# Patient Record
Sex: MALE | Race: WHITE | URBAN - METROPOLITAN AREA
[De-identification: names, ages, dates, MRNs, and addresses within clinical notes are randomized per-mention and may not be internally consistent; named-entity substitution may affect disease eponyms.]

---

## 2017-06-06 ENCOUNTER — OFFICE VISIT - HEALTHEAST (OUTPATIENT)
Dept: FAMILY MEDICINE | Facility: CLINIC | Age: 47
End: 2017-06-06

## 2017-06-06 DIAGNOSIS — L97.911 TRAUMATIC LEG ULCER, RIGHT, LIMITED TO BREAKDOWN OF SKIN (H): ICD-10-CM

## 2017-06-06 DIAGNOSIS — M79.89 LEG SWELLING: ICD-10-CM

## 2017-06-08 ENCOUNTER — OFFICE VISIT - HEALTHEAST (OUTPATIENT)
Dept: FAMILY MEDICINE | Facility: CLINIC | Age: 47
End: 2017-06-08

## 2017-06-08 DIAGNOSIS — M79.89 LEG SWELLING: ICD-10-CM

## 2017-06-08 DIAGNOSIS — J33.8 OTHER POLYP OF SINUS: ICD-10-CM

## 2017-06-08 DIAGNOSIS — L97.911 TRAUMATIC LEG ULCER, RIGHT, LIMITED TO BREAKDOWN OF SKIN (H): ICD-10-CM

## 2017-06-08 DIAGNOSIS — E78.00 HYPERCHOLESTEREMIA: ICD-10-CM

## 2017-06-20 ENCOUNTER — COMMUNICATION - HEALTHEAST (OUTPATIENT)
Dept: FAMILY MEDICINE | Facility: CLINIC | Age: 47
End: 2017-06-20

## 2017-09-11 ENCOUNTER — COMMUNICATION - HEALTHEAST (OUTPATIENT)
Dept: FAMILY MEDICINE | Facility: CLINIC | Age: 47
End: 2017-09-11

## 2017-09-11 DIAGNOSIS — I10 HTN (HYPERTENSION): ICD-10-CM

## 2017-11-29 ENCOUNTER — OFFICE VISIT - HEALTHEAST (OUTPATIENT)
Dept: FAMILY MEDICINE | Facility: CLINIC | Age: 47
End: 2017-11-29

## 2017-11-29 DIAGNOSIS — G89.29 CHRONIC RIGHT SHOULDER PAIN: ICD-10-CM

## 2017-11-29 DIAGNOSIS — M25.511 CHRONIC RIGHT SHOULDER PAIN: ICD-10-CM

## 2017-12-03 ENCOUNTER — OFFICE VISIT - HEALTHEAST (OUTPATIENT)
Dept: FAMILY MEDICINE | Facility: CLINIC | Age: 47
End: 2017-12-03

## 2017-12-03 DIAGNOSIS — J02.9 SORE THROAT: ICD-10-CM

## 2017-12-19 ENCOUNTER — OFFICE VISIT - HEALTHEAST (OUTPATIENT)
Dept: FAMILY MEDICINE | Facility: CLINIC | Age: 47
End: 2017-12-19

## 2017-12-19 DIAGNOSIS — S99.912A LEFT ANKLE INJURY, INITIAL ENCOUNTER: ICD-10-CM

## 2018-01-12 ENCOUNTER — RECORDS - HEALTHEAST (OUTPATIENT)
Dept: ADMINISTRATIVE | Facility: OTHER | Age: 48
End: 2018-01-12

## 2018-03-08 ENCOUNTER — COMMUNICATION - HEALTHEAST (OUTPATIENT)
Dept: FAMILY MEDICINE | Facility: CLINIC | Age: 48
End: 2018-03-08

## 2018-03-08 DIAGNOSIS — I10 HTN (HYPERTENSION): ICD-10-CM

## 2018-03-09 ENCOUNTER — AMBULATORY - HEALTHEAST (OUTPATIENT)
Dept: FAMILY MEDICINE | Facility: CLINIC | Age: 48
End: 2018-03-09

## 2018-03-09 DIAGNOSIS — I10 HTN (HYPERTENSION): ICD-10-CM

## 2018-04-05 ENCOUNTER — COMMUNICATION - HEALTHEAST (OUTPATIENT)
Dept: FAMILY MEDICINE | Facility: CLINIC | Age: 48
End: 2018-04-05

## 2018-04-05 DIAGNOSIS — I10 HTN (HYPERTENSION): ICD-10-CM

## 2018-04-17 ENCOUNTER — RECORDS - HEALTHEAST (OUTPATIENT)
Dept: ADMINISTRATIVE | Facility: OTHER | Age: 48
End: 2018-04-17

## 2018-04-24 ENCOUNTER — OFFICE VISIT - HEALTHEAST (OUTPATIENT)
Dept: FAMILY MEDICINE | Facility: CLINIC | Age: 48
End: 2018-04-24

## 2018-04-24 DIAGNOSIS — E66.9 OBESITY: ICD-10-CM

## 2018-04-24 DIAGNOSIS — Z95.1 S/P CABG X 2: ICD-10-CM

## 2018-04-24 DIAGNOSIS — I21.9 ACUTE MYOCARDIAL INFARCTION (H): ICD-10-CM

## 2018-04-24 DIAGNOSIS — G47.30 SLEEP APNEA: ICD-10-CM

## 2018-04-24 DIAGNOSIS — I10 ESSENTIAL HYPERTENSION: ICD-10-CM

## 2018-04-24 DIAGNOSIS — Z95.5 STENTED CORONARY ARTERY: ICD-10-CM

## 2018-04-24 ASSESSMENT — MIFFLIN-ST. JEOR: SCORE: 2284.67

## 2018-05-11 ENCOUNTER — AMBULATORY - HEALTHEAST (OUTPATIENT)
Dept: CARDIOLOGY | Facility: CLINIC | Age: 48
End: 2018-05-11

## 2018-05-17 ENCOUNTER — OFFICE VISIT - HEALTHEAST (OUTPATIENT)
Dept: CARDIOLOGY | Facility: CLINIC | Age: 48
End: 2018-05-17

## 2018-05-17 DIAGNOSIS — E78.2 MIXED HYPERLIPIDEMIA: ICD-10-CM

## 2018-05-17 DIAGNOSIS — I25.810 CORONARY ARTERY DISEASE INVOLVING CORONARY BYPASS GRAFT OF NATIVE HEART WITHOUT ANGINA PECTORIS: ICD-10-CM

## 2018-05-17 DIAGNOSIS — I10 ESSENTIAL HYPERTENSION: ICD-10-CM

## 2018-05-17 ASSESSMENT — MIFFLIN-ST. JEOR: SCORE: 2271.07

## 2018-06-14 ENCOUNTER — COMMUNICATION - HEALTHEAST (OUTPATIENT)
Dept: SLEEP MEDICINE | Facility: CLINIC | Age: 48
End: 2018-06-14

## 2018-06-21 ENCOUNTER — OFFICE VISIT - HEALTHEAST (OUTPATIENT)
Dept: SLEEP MEDICINE | Facility: CLINIC | Age: 48
End: 2018-06-21

## 2018-06-21 ENCOUNTER — AMBULATORY - HEALTHEAST (OUTPATIENT)
Dept: SLEEP MEDICINE | Facility: CLINIC | Age: 48
End: 2018-06-21

## 2018-06-21 DIAGNOSIS — J33.9 NASAL POLYP: ICD-10-CM

## 2018-06-21 DIAGNOSIS — G47.33 OSA ON CPAP: ICD-10-CM

## 2018-06-21 ASSESSMENT — MIFFLIN-ST. JEOR: SCORE: 2311.89

## 2018-06-26 ENCOUNTER — OFFICE VISIT - HEALTHEAST (OUTPATIENT)
Dept: FAMILY MEDICINE | Facility: CLINIC | Age: 48
End: 2018-06-26

## 2018-06-26 DIAGNOSIS — L72.3 SEBACEOUS CYST: ICD-10-CM

## 2018-06-26 DIAGNOSIS — Z95.1 S/P CABG X 2: ICD-10-CM

## 2018-06-26 DIAGNOSIS — J33.9 NASAL POLYP: ICD-10-CM

## 2018-06-26 DIAGNOSIS — G47.33 OBSTRUCTIVE SLEEP APNEA: ICD-10-CM

## 2018-06-26 DIAGNOSIS — I10 HTN (HYPERTENSION): ICD-10-CM

## 2018-06-26 DIAGNOSIS — I25.10 CAD (CORONARY ARTERY DISEASE): ICD-10-CM

## 2018-06-26 ASSESSMENT — MIFFLIN-ST. JEOR: SCORE: 2298.28

## 2018-07-05 ENCOUNTER — AMBULATORY - HEALTHEAST (OUTPATIENT)
Dept: SLEEP MEDICINE | Facility: CLINIC | Age: 48
End: 2018-07-05

## 2018-07-09 ENCOUNTER — OFFICE VISIT - HEALTHEAST (OUTPATIENT)
Dept: OTOLARYNGOLOGY | Facility: CLINIC | Age: 48
End: 2018-07-09

## 2018-07-09 DIAGNOSIS — J33.9 NASAL POLYPOSIS: ICD-10-CM

## 2018-07-17 ENCOUNTER — OFFICE VISIT - HEALTHEAST (OUTPATIENT)
Dept: FAMILY MEDICINE | Facility: CLINIC | Age: 48
End: 2018-07-17

## 2018-07-17 ENCOUNTER — ANESTHESIA - HEALTHEAST (OUTPATIENT)
Dept: SURGERY | Facility: CLINIC | Age: 48
End: 2018-07-17

## 2018-07-17 DIAGNOSIS — Z95.1 S/P CABG X 2: ICD-10-CM

## 2018-07-17 DIAGNOSIS — Z95.5 STENTED CORONARY ARTERY: ICD-10-CM

## 2018-07-17 DIAGNOSIS — G47.33 OBSTRUCTIVE SLEEP APNEA: ICD-10-CM

## 2018-07-17 DIAGNOSIS — J33.9 NASAL POLYP: ICD-10-CM

## 2018-07-17 DIAGNOSIS — Z01.818 PREOPERATIVE EXAMINATION: ICD-10-CM

## 2018-07-17 LAB
ANION GAP SERPL CALCULATED.3IONS-SCNC: 11 MMOL/L (ref 5–18)
BASOPHILS # BLD AUTO: 0.1 THOU/UL (ref 0–0.2)
BASOPHILS NFR BLD AUTO: 1 % (ref 0–2)
BUN SERPL-MCNC: 15 MG/DL (ref 8–22)
CALCIUM SERPL-MCNC: 9.6 MG/DL (ref 8.5–10.5)
CHLORIDE BLD-SCNC: 108 MMOL/L (ref 98–107)
CO2 SERPL-SCNC: 23 MMOL/L (ref 22–31)
CREAT SERPL-MCNC: 0.82 MG/DL (ref 0.7–1.3)
EOSINOPHIL # BLD AUTO: 0.2 THOU/UL (ref 0–0.4)
EOSINOPHIL NFR BLD AUTO: 1 % (ref 0–6)
ERYTHROCYTE [DISTWIDTH] IN BLOOD BY AUTOMATED COUNT: 11.5 % (ref 11–14.5)
GFR SERPL CREATININE-BSD FRML MDRD: >60 ML/MIN/1.73M2
GLUCOSE BLD-MCNC: 128 MG/DL (ref 70–125)
HCT VFR BLD AUTO: 43.5 % (ref 40–54)
HGB BLD-MCNC: 14.7 G/DL (ref 14–18)
INR PPP: 0.95 (ref 0.9–1.1)
LYMPHOCYTES # BLD AUTO: 3 THOU/UL (ref 0.8–4.4)
LYMPHOCYTES NFR BLD AUTO: 20 % (ref 20–40)
MCH RBC QN AUTO: 31.5 PG (ref 27–34)
MCHC RBC AUTO-ENTMCNC: 33.7 G/DL (ref 32–36)
MCV RBC AUTO: 93 FL (ref 80–100)
MONOCYTES # BLD AUTO: 0.8 THOU/UL (ref 0–0.9)
MONOCYTES NFR BLD AUTO: 5 % (ref 2–10)
NEUTROPHILS # BLD AUTO: 10.9 THOU/UL (ref 2–7.7)
NEUTROPHILS NFR BLD AUTO: 73 % (ref 50–70)
PLATELET # BLD AUTO: 334 THOU/UL (ref 140–440)
PMV BLD AUTO: 7.6 FL (ref 7–10)
POTASSIUM BLD-SCNC: 3.9 MMOL/L (ref 3.5–5)
RBC # BLD AUTO: 4.66 MILL/UL (ref 4.4–6.2)
SODIUM SERPL-SCNC: 142 MMOL/L (ref 136–145)
WBC: 14.9 THOU/UL (ref 4–11)

## 2018-07-17 ASSESSMENT — MIFFLIN-ST. JEOR: SCORE: 2271.07

## 2018-07-18 ENCOUNTER — COMMUNICATION - HEALTHEAST (OUTPATIENT)
Dept: FAMILY MEDICINE | Facility: CLINIC | Age: 48
End: 2018-07-18

## 2018-07-18 ENCOUNTER — SURGERY - HEALTHEAST (OUTPATIENT)
Dept: SURGERY | Facility: CLINIC | Age: 48
End: 2018-07-18

## 2018-07-18 LAB
ATRIAL RATE - MUSE: 99 BPM
DIASTOLIC BLOOD PRESSURE - MUSE: NORMAL MMHG
INTERPRETATION ECG - MUSE: NORMAL
P AXIS - MUSE: 51 DEGREES
PR INTERVAL - MUSE: 144 MS
QRS DURATION - MUSE: 112 MS
QT - MUSE: 362 MS
QTC - MUSE: 464 MS
R AXIS - MUSE: 14 DEGREES
SYSTOLIC BLOOD PRESSURE - MUSE: NORMAL MMHG
T AXIS - MUSE: 51 DEGREES
VENTRICULAR RATE- MUSE: 99 BPM

## 2018-07-18 ASSESSMENT — MIFFLIN-ST. JEOR: SCORE: 2272.43

## 2018-07-19 ENCOUNTER — COMMUNICATION - HEALTHEAST (OUTPATIENT)
Dept: FAMILY MEDICINE | Facility: CLINIC | Age: 48
End: 2018-07-19

## 2018-07-19 ASSESSMENT — MIFFLIN-ST. JEOR: SCORE: 2295.11

## 2018-07-23 ENCOUNTER — OFFICE VISIT - HEALTHEAST (OUTPATIENT)
Dept: FAMILY MEDICINE | Facility: CLINIC | Age: 48
End: 2018-07-23

## 2018-07-23 DIAGNOSIS — G47.33 OBSTRUCTIVE SLEEP APNEA: ICD-10-CM

## 2018-07-23 DIAGNOSIS — J33.9 NASAL POLYPOSIS: ICD-10-CM

## 2018-07-23 ASSESSMENT — MIFFLIN-ST. JEOR: SCORE: 2302.82

## 2018-07-25 ENCOUNTER — COMMUNICATION - HEALTHEAST (OUTPATIENT)
Dept: FAMILY MEDICINE | Facility: CLINIC | Age: 48
End: 2018-07-25

## 2018-07-31 ENCOUNTER — AMBULATORY - HEALTHEAST (OUTPATIENT)
Dept: SLEEP MEDICINE | Facility: CLINIC | Age: 48
End: 2018-07-31

## 2018-08-01 ENCOUNTER — OFFICE VISIT - HEALTHEAST (OUTPATIENT)
Dept: OTOLARYNGOLOGY | Facility: CLINIC | Age: 48
End: 2018-08-01

## 2018-08-01 DIAGNOSIS — Z98.890 S/P FUNCTIONAL ENDOSCOPIC SINUS SURGERY: ICD-10-CM

## 2018-08-24 ENCOUNTER — COMMUNICATION - HEALTHEAST (OUTPATIENT)
Dept: FAMILY MEDICINE | Facility: CLINIC | Age: 48
End: 2018-08-24

## 2018-09-13 ENCOUNTER — OFFICE VISIT - HEALTHEAST (OUTPATIENT)
Dept: FAMILY MEDICINE | Facility: CLINIC | Age: 48
End: 2018-09-13

## 2018-09-13 DIAGNOSIS — R00.2 PALPITATIONS: ICD-10-CM

## 2018-09-13 DIAGNOSIS — I25.2 HISTORY OF MI (MYOCARDIAL INFARCTION): ICD-10-CM

## 2018-09-13 DIAGNOSIS — I25.10 CAD (CORONARY ARTERY DISEASE): ICD-10-CM

## 2018-09-13 ASSESSMENT — MIFFLIN-ST. JEOR: SCORE: 2314.15

## 2018-09-14 LAB
ATRIAL RATE - MUSE: 92 BPM
DIASTOLIC BLOOD PRESSURE - MUSE: NORMAL MMHG
INTERPRETATION ECG - MUSE: NORMAL
P AXIS - MUSE: 41 DEGREES
PR INTERVAL - MUSE: 138 MS
QRS DURATION - MUSE: 116 MS
QT - MUSE: 390 MS
QTC - MUSE: 482 MS
R AXIS - MUSE: 10 DEGREES
SYSTOLIC BLOOD PRESSURE - MUSE: NORMAL MMHG
T AXIS - MUSE: 50 DEGREES
VENTRICULAR RATE- MUSE: 92 BPM

## 2018-09-18 ENCOUNTER — OFFICE VISIT - HEALTHEAST (OUTPATIENT)
Dept: FAMILY MEDICINE | Facility: CLINIC | Age: 48
End: 2018-09-18

## 2018-09-18 DIAGNOSIS — I10 ESSENTIAL HYPERTENSION: ICD-10-CM

## 2018-09-18 DIAGNOSIS — G47.33 OBSTRUCTIVE SLEEP APNEA: ICD-10-CM

## 2018-09-18 DIAGNOSIS — E66.01 MORBID OBESITY (H): ICD-10-CM

## 2018-09-18 DIAGNOSIS — Z95.1 S/P CABG X 2: ICD-10-CM

## 2018-09-18 DIAGNOSIS — R00.2 PALPITATIONS: ICD-10-CM

## 2018-09-18 LAB
ALBUMIN SERPL-MCNC: 3.9 G/DL (ref 3.5–5)
ALP SERPL-CCNC: 63 U/L (ref 45–120)
ALT SERPL W P-5'-P-CCNC: 37 U/L (ref 0–45)
ANION GAP SERPL CALCULATED.3IONS-SCNC: 8 MMOL/L (ref 5–18)
AST SERPL W P-5'-P-CCNC: 24 U/L (ref 0–40)
BASOPHILS # BLD AUTO: 0.1 THOU/UL (ref 0–0.2)
BASOPHILS NFR BLD AUTO: 1 % (ref 0–2)
BILIRUB SERPL-MCNC: 0.6 MG/DL (ref 0–1)
BUN SERPL-MCNC: 13 MG/DL (ref 8–22)
CALCIUM SERPL-MCNC: 9.6 MG/DL (ref 8.5–10.5)
CHLORIDE BLD-SCNC: 106 MMOL/L (ref 98–107)
CHOLEST SERPL-MCNC: 222 MG/DL
CO2 SERPL-SCNC: 25 MMOL/L (ref 22–31)
CREAT SERPL-MCNC: 0.73 MG/DL (ref 0.7–1.3)
EOSINOPHIL # BLD AUTO: 0.8 THOU/UL (ref 0–0.4)
EOSINOPHIL NFR BLD AUTO: 9 % (ref 0–6)
ERYTHROCYTE [DISTWIDTH] IN BLOOD BY AUTOMATED COUNT: 11.2 % (ref 11–14.5)
FASTING STATUS PATIENT QL REPORTED: NO
GFR SERPL CREATININE-BSD FRML MDRD: >60 ML/MIN/1.73M2
GLUCOSE BLD-MCNC: 108 MG/DL (ref 70–125)
HCT VFR BLD AUTO: 42.7 % (ref 40–54)
HDLC SERPL-MCNC: 41 MG/DL
HGB BLD-MCNC: 14.7 G/DL (ref 14–18)
LDLC SERPL CALC-MCNC: 143 MG/DL
LYMPHOCYTES # BLD AUTO: 2.8 THOU/UL (ref 0.8–4.4)
LYMPHOCYTES NFR BLD AUTO: 31 % (ref 20–40)
MCH RBC QN AUTO: 32 PG (ref 27–34)
MCHC RBC AUTO-ENTMCNC: 34.5 G/DL (ref 32–36)
MCV RBC AUTO: 93 FL (ref 80–100)
MONOCYTES # BLD AUTO: 0.6 THOU/UL (ref 0–0.9)
MONOCYTES NFR BLD AUTO: 6 % (ref 2–10)
NEUTROPHILS # BLD AUTO: 4.9 THOU/UL (ref 2–7.7)
NEUTROPHILS NFR BLD AUTO: 54 % (ref 50–70)
PLATELET # BLD AUTO: 277 THOU/UL (ref 140–440)
PMV BLD AUTO: 7.4 FL (ref 7–10)
POTASSIUM BLD-SCNC: 4.3 MMOL/L (ref 3.5–5)
PROT SERPL-MCNC: 7 G/DL (ref 6–8)
RBC # BLD AUTO: 4.61 MILL/UL (ref 4.4–6.2)
SODIUM SERPL-SCNC: 139 MMOL/L (ref 136–145)
TRIGL SERPL-MCNC: 188 MG/DL
TSH SERPL DL<=0.005 MIU/L-ACNC: 2.17 UIU/ML (ref 0.3–5)
WBC: 9.2 THOU/UL (ref 4–11)

## 2018-09-18 ASSESSMENT — MIFFLIN-ST. JEOR: SCORE: 2314.15

## 2018-09-19 ENCOUNTER — COMMUNICATION - HEALTHEAST (OUTPATIENT)
Dept: ADMINISTRATIVE | Facility: CLINIC | Age: 48
End: 2018-09-19

## 2018-09-19 ENCOUNTER — COMMUNICATION - HEALTHEAST (OUTPATIENT)
Dept: FAMILY MEDICINE | Facility: CLINIC | Age: 48
End: 2018-09-19

## 2018-09-21 ENCOUNTER — OFFICE VISIT - HEALTHEAST (OUTPATIENT)
Dept: SLEEP MEDICINE | Facility: CLINIC | Age: 48
End: 2018-09-21

## 2018-09-21 DIAGNOSIS — G47.33 OSA ON CPAP: ICD-10-CM

## 2018-09-21 ASSESSMENT — MIFFLIN-ST. JEOR: SCORE: 2305.08

## 2018-09-29 ENCOUNTER — OFFICE VISIT - HEALTHEAST (OUTPATIENT)
Dept: FAMILY MEDICINE | Facility: CLINIC | Age: 48
End: 2018-09-29

## 2018-09-29 DIAGNOSIS — M77.11 LATERAL EPICONDYLITIS OF RIGHT ELBOW: ICD-10-CM

## 2018-10-24 ENCOUNTER — COMMUNICATION - HEALTHEAST (OUTPATIENT)
Dept: ADMINISTRATIVE | Facility: CLINIC | Age: 48
End: 2018-10-24

## 2019-02-11 ENCOUNTER — OFFICE VISIT - HEALTHEAST (OUTPATIENT)
Dept: FAMILY MEDICINE | Facility: CLINIC | Age: 49
End: 2019-02-11

## 2019-02-11 DIAGNOSIS — M25.551 CHRONIC RIGHT HIP PAIN: ICD-10-CM

## 2019-02-11 DIAGNOSIS — M54.50 ACUTE RIGHT-SIDED LOW BACK PAIN WITHOUT SCIATICA: ICD-10-CM

## 2019-02-11 DIAGNOSIS — G89.29 CHRONIC RIGHT HIP PAIN: ICD-10-CM

## 2019-02-15 ENCOUNTER — OFFICE VISIT - HEALTHEAST (OUTPATIENT)
Dept: FAMILY MEDICINE | Facility: CLINIC | Age: 49
End: 2019-02-15

## 2019-02-15 ENCOUNTER — COMMUNICATION - HEALTHEAST (OUTPATIENT)
Dept: FAMILY MEDICINE | Facility: CLINIC | Age: 49
End: 2019-02-15

## 2019-02-15 DIAGNOSIS — G47.33 OBSTRUCTIVE SLEEP APNEA: ICD-10-CM

## 2019-02-15 DIAGNOSIS — M25.551 HIP PAIN, RIGHT: ICD-10-CM

## 2019-02-15 DIAGNOSIS — M54.41 ACUTE RIGHT-SIDED LOW BACK PAIN WITH RIGHT-SIDED SCIATICA: ICD-10-CM

## 2019-02-28 ENCOUNTER — COMMUNICATION - HEALTHEAST (OUTPATIENT)
Dept: NURSING | Facility: CLINIC | Age: 49
End: 2019-02-28

## 2019-03-14 ENCOUNTER — COMMUNICATION - HEALTHEAST (OUTPATIENT)
Dept: NURSING | Facility: CLINIC | Age: 49
End: 2019-03-14

## 2019-05-15 ENCOUNTER — COMMUNICATION - HEALTHEAST (OUTPATIENT)
Dept: FAMILY MEDICINE | Facility: CLINIC | Age: 49
End: 2019-05-15

## 2019-07-03 ENCOUNTER — COMMUNICATION - HEALTHEAST (OUTPATIENT)
Dept: FAMILY MEDICINE | Facility: CLINIC | Age: 49
End: 2019-07-03

## 2019-07-03 DIAGNOSIS — I10 HTN (HYPERTENSION): ICD-10-CM

## 2019-11-08 ENCOUNTER — HEALTH MAINTENANCE LETTER (OUTPATIENT)
Age: 49
End: 2019-11-08

## 2020-05-06 ENCOUNTER — COMMUNICATION - HEALTHEAST (OUTPATIENT)
Dept: FAMILY MEDICINE | Facility: CLINIC | Age: 50
End: 2020-05-06

## 2020-05-06 DIAGNOSIS — I10 HTN (HYPERTENSION): ICD-10-CM

## 2020-05-11 ENCOUNTER — COMMUNICATION - HEALTHEAST (OUTPATIENT)
Dept: FAMILY MEDICINE | Facility: CLINIC | Age: 50
End: 2020-05-11

## 2020-12-06 ENCOUNTER — HEALTH MAINTENANCE LETTER (OUTPATIENT)
Age: 50
End: 2020-12-06

## 2021-05-30 NOTE — TELEPHONE ENCOUNTER
Refill Approved    Rx renewed per Medication Renewal Policy. Medication was last renewed on 6/26/18.  Last OV:  2/15/19    Radha Carrillo, [facility] Connection Triage/Med Refill 7/4/2019     Requested Prescriptions   Pending Prescriptions Disp Refills     lisinopril-hydrochlorothiazide (PRINZIDE,ZESTORETIC) 20-25 mg per tablet [Pharmacy Med Name: LISINOPRIL-HYDROCHLOROTHIAZIDE 20 MG-25MG TABLET] 90 tablet 2     Sig: Take 1 tablet by mouth every morning.       Diuretics/Combination Diuretics Refill Protocol  Passed - 7/3/2019 12:00 PM        Passed - Visit with PCP or prescribing provider visit in past 12 months     Last office visit with prescriber/PCP: 2/15/2019 Carina Carter MD OR same dept: 2/15/2019 Carina Carter MD OR same specialty: 2/15/2019 Carina Carter MD  Last physical: 7/17/2018 Last MTM visit: Visit date not found   Next visit within 3 mo: Visit date not found  Next physical within 3 mo: Visit date not found  Prescriber OR PCP: Carina Carter MD  Last diagnosis associated with med order: 1. HTN (hypertension)  - lisinopril-hydrochlorothiazide (PRINZIDE,ZESTORETIC) 20-25 mg per tablet [Pharmacy Med Name: LISINOPRIL-HYDROCHLOROTHIAZIDE 20 MG-25MG TABLET]; Take 1 tablet by mouth every morning.  Dispense: 90 tablet; Refill: 3    If protocol passes may refill for 12 months if within 3 months of last provider visit (or a total of 15 months).             Passed - Serum Potassium in past 12 months      Lab Results   Component Value Date    Potassium 4.3 09/18/2018             Passed - Serum Sodium in past 12 months      Lab Results   Component Value Date    Sodium 139 09/18/2018             Passed - Blood pressure on file in past 12 months     BP Readings from Last 1 Encounters:   02/15/19 122/82             Passed - Serum Creatinine in past 12 months      Creatinine   Date Value Ref Range Status   09/18/2018 0.73 0.70 - 1.30 mg/dL Final

## 2021-05-31 VITALS — WEIGHT: 300 LBS | BODY MASS INDEX: 45.61 KG/M2

## 2021-05-31 VITALS — BODY MASS INDEX: 46.68 KG/M2 | WEIGHT: 307 LBS

## 2021-05-31 VITALS — BODY MASS INDEX: 47.81 KG/M2 | WEIGHT: 314.44 LBS

## 2021-05-31 VITALS — BODY MASS INDEX: 46.38 KG/M2 | WEIGHT: 305 LBS

## 2021-05-31 VITALS — BODY MASS INDEX: 47.35 KG/M2 | WEIGHT: 311.4 LBS

## 2021-06-01 ENCOUNTER — RECORDS - HEALTHEAST (OUTPATIENT)
Dept: ADMINISTRATIVE | Facility: CLINIC | Age: 51
End: 2021-06-01

## 2021-06-01 VITALS — WEIGHT: 315 LBS | BODY MASS INDEX: 46.65 KG/M2 | HEIGHT: 69 IN

## 2021-06-01 VITALS — BODY MASS INDEX: 46.65 KG/M2 | WEIGHT: 315 LBS | HEIGHT: 69 IN

## 2021-06-01 VITALS — HEIGHT: 69 IN | BODY MASS INDEX: 46.65 KG/M2 | WEIGHT: 315 LBS

## 2021-06-01 VITALS — HEIGHT: 69 IN | WEIGHT: 315 LBS | BODY MASS INDEX: 46.65 KG/M2

## 2021-06-01 VITALS — BODY MASS INDEX: 46.06 KG/M2 | HEIGHT: 69 IN | WEIGHT: 311 LBS

## 2021-06-01 VITALS — WEIGHT: 311 LBS | BODY MASS INDEX: 46.06 KG/M2 | HEIGHT: 69 IN

## 2021-06-01 VITALS — WEIGHT: 314 LBS | HEIGHT: 69 IN | BODY MASS INDEX: 46.51 KG/M2

## 2021-06-02 ENCOUNTER — RECORDS - HEALTHEAST (OUTPATIENT)
Dept: ADMINISTRATIVE | Facility: CLINIC | Age: 51
End: 2021-06-02

## 2021-06-02 VITALS — BODY MASS INDEX: 46.35 KG/M2 | WEIGHT: 315 LBS

## 2021-06-02 VITALS — BODY MASS INDEX: 45.1 KG/M2 | WEIGHT: 315 LBS | HEIGHT: 70 IN

## 2021-06-02 VITALS — HEIGHT: 70 IN | WEIGHT: 315 LBS | BODY MASS INDEX: 45.1 KG/M2

## 2021-06-02 VITALS — BODY MASS INDEX: 46.29 KG/M2 | WEIGHT: 315 LBS

## 2021-06-02 VITALS — WEIGHT: 315 LBS | HEIGHT: 70 IN | BODY MASS INDEX: 45.1 KG/M2

## 2021-06-02 VITALS — WEIGHT: 312 LBS | BODY MASS INDEX: 44.77 KG/M2

## 2021-06-08 NOTE — TELEPHONE ENCOUNTER
Left message for pt to call back. Pt needs appointment prior to refills of medication. Please assist in scheduling.

## 2021-06-08 NOTE — TELEPHONE ENCOUNTER
Left message for pt to call back. Pt needs appointment prior to refills. MyChart message being sent as well. Please assist in scheduling

## 2021-06-08 NOTE — TELEPHONE ENCOUNTER
RN cannot approve Refill Request    RN can NOT refill this medication Protocol failed and NO refill given.      Georgie Dee, Care Connection Triage/Med Refill 5/7/2020    Requested Prescriptions   Pending Prescriptions Disp Refills     lisinopriL-hydrochlorothiazide (PRINZIDE,ZESTORETIC) 20-25 mg per tablet [Pharmacy Med Name: lisinopril 20 mg-hydrochlorothiazide 25 mg tablet] 90 tablet 3     Sig: TAKE 1 TABLET BY MOUTH EVERY MORNING       Diuretics/Combination Diuretics Refill Protocol  Failed - 5/6/2020  3:57 PM        Failed - Visit with PCP or prescribing provider visit in past 12 months     Last office visit with prescriber/PCP: 2/15/2019 Carina Carter MD OR same dept: Visit date not found OR same specialty: 2/15/2019 Carina Carter MD  Last physical: 7/17/2018 Last MTM visit: Visit date not found   Next visit within 3 mo: Visit date not found  Next physical within 3 mo: Visit date not found  Prescriber OR PCP: Carina Carter MD  Last diagnosis associated with med order: 1. HTN (hypertension)  - lisinopriL-hydrochlorothiazide (PRINZIDE,ZESTORETIC) 20-25 mg per tablet [Pharmacy Med Name: lisinopril 20 mg-hydrochlorothiazide 25 mg tablet]; Take 1 tablet by mouth every morning.  Dispense: 90 tablet; Refill: 2    If protocol passes may refill for 12 months if within 3 months of last provider visit (or a total of 15 months).             Failed - Serum Potassium in past 12 months      No results found for: LN-POTASSIUM          Failed - Serum Sodium in past 12 months      No results found for: LN-SODIUM          Failed - Blood pressure on file in past 12 months     BP Readings from Last 1 Encounters:   02/15/19 122/82             Failed - Serum Creatinine in past 12 months      Creatinine   Date Value Ref Range Status   09/18/2018 0.73 0.70 - 1.30 mg/dL Final

## 2021-06-11 NOTE — PROGRESS NOTES
Assessment & Plan:  1. Hypercholesteremia  Patient requesting refill on atorvastatin.  Discussed that he is due for lab work but not fasting today.  Entered a future order for lab work and will provide 90 day refill until he is able to come in for labs.  Also recommended that he come in for annual physical in the next 6 months.  - atorvastatin (LIPITOR) 80 MG tablet; Take 1 tablet (80 mg total) by mouth at bedtime.  Dispense: 90 tablet; Refill: 0  - Lipid Manati FASTING; Future    2. Traumatic leg ulcer, right, limited to breakdown of skin  Ulcer is healing as expected.  No signs of infection today.  Discussed at home cares and wound dressing.  Discussed signs and symptoms of infection and when to return to clinic.    3. Leg swelling  Some concern for healing given the amount of swelling and venous stasis that seems to be chronic for the patient.  Discussed with him that weight loss and dietary changes would help him to have less lower extremity swelling most likely.  Encouraged him to try to improve diet and encouraged him in losing weight.  Also important that he follow-up with cardiology for at least annual monitoring unless they tell him differently.  If he feels that the wound is not healing as expected he should let us know and we would consider referral to vascular for management of the wound at that time.    4. Other polyp of sinus  Chronic polyps of the sinus.  Patient was supposed to have surgery but never got it scheduled.  We will enter new referral to ENT for evaluation.  Patient requesting prednisone today for treatment of sinus pressure, however declined due to problems this could cause with wound healing.  - Ambulatory referral to ENT      There are no Patient Instructions on file for this visit.    Orders Placed This Encounter   Procedures     Lipid Manati FASTING     Standing Status:   Future     Standing Expiration Date:   6/8/2018     Order Specific Question:   Fasting is required?     Answer:    Yes     Ambulatory referral to ENT     Referral Priority:   Routine     Referral Type:   Consultation     Referral Reason:   Evaluation and Treatment     Requested Specialty:   Otolaryngology     Number of Visits Requested:   1     Medications Discontinued During This Encounter   Medication Reason     atorvastatin (LIPITOR) 80 MG tablet Reorder           The following high BMI interventions were performed this visit: encouragement to exercise, weight monitoring and dietary management education, guidance, and counseling    Chief Complaint:   Chief Complaint   Patient presents with     Follow-up     leg wound        History of Present Illness:  Sigifredo is a 46 y.o. male presenting to the clinic today for follow-up from walk-in care visit on 6/6/2017.  Patient presented at that time with greater than 1 year complaint of leg swelling which had been chronic for him.  He also has chronic blistering that can come and go on the right lower leg.  2 days ago he had an itch on his right lower leg and when he scratched it it opened up and created an open sore.  No active drainage or redness, but he is concerned and was recommended to follow-up for monitoring of signs of infection.  He has some questions today about why he gets chronic leg swelling and why it would contribute to him performing this type of wound.    Secondly he mentions that he is due to follow-up with his cardiologist.  He had an acute MI in 2014 and has not seen cardiology in a few years.  He is due for lab work today but is not fasting.  He is requesting refills on his lipid medication.  I told him that he needs to follow with cardiology and also to schedule a physical but we will provide him with a refill today in order future labs so he can return to get those accomplished.    Lastly he mentions some ongoing sinus polyps that he has had chronically.  He had seen an ENT a couple of years ago and was supposed to be scheduled for surgery but this never occurred.   He has chronic congestion and sinus pressure.  He has questions today about going on prednisone to treat some of this until he can have surgery.    Review of Systems:  All other systems are negative except as noted above.    [facility]:  Reviewed and updated.    Tobacco Use:  History   Smoking Status     Former Smoker   Smokeless Tobacco     Current User     Comment: Quit 10/11/14 and uses E-cigarette daily        Vitals:  Vitals:    06/08/17 0941   BP: 142/80   Patient Site: Right Arm   Patient Position: Sitting   Cuff Size: Adult Large   Pulse: 78   Resp: 18   Weight: (!) 314 lb 7 oz (142.6 kg)     Wt Readings from Last 3 Encounters:   06/08/17 (!) 314 lb 7 oz (142.6 kg)   06/06/17 (!) 311 lb 6.4 oz (141.3 kg)   09/14/16 (!) 300 lb (136.1 kg)       Physical Exam:  Constitutional:  Reveals an alert, cooperative, 46-year-old male in no acute distress.  Vitals:  Per nursing notes.  Cardiovascular:  Regular rate and rhythm without murmurs, rubs, or gallops. Carotids without bruits. Legs swollen but without pitting edema.   Respiratory: Clear.  Respiratory effort normal.  Skin:   Ulceration on the lower anterior leg approximately 4 cm in diameter.  Appears to have normal healing with granulation tissue.  No signs of infection, no redness warmth or swelling around the wound.  Musculoskeletal: CMS intact, distal pulses intact but slightly difficult to palpate.  Psychiatric:  Mood appropriate, memory intact.     Data Reviewed:  Additional History from Old Records or Another Person Summarized (2 total): None.     Decision to Obtain Extra information (1 total): None.     Radiology Tests Summarized and Ordered (XRAY/CT/MRI/DXA) (1 total): None.    Labs Reviewed and Ordered (1 total): Lipid Fannin    Medicine Tests Summarized and Ordered (EKG/ECHO/COLONOSCOPY/EGD) (1 total): None.    Independent Review of EKG or X-Ray (2 each): None.    The visit lasted a total of 25 minutes face to face with the patient. Over 50% of the time was  spent counseling and educating the patient about plan of care.    Medications:  Current Outpatient Prescriptions   Medication Sig Dispense Refill     aspirin 81 MG EC tablet Take 81 mg by mouth daily.       atorvastatin (LIPITOR) 80 MG tablet Take 1 tablet (80 mg total) by mouth at bedtime. 90 tablet 0     fluticasone (FLONASE) 50 mcg/actuation nasal spray 2 sprays in each nostril twice daily. 16 g 0     lisinopril-hydrochlorothiazide (PRINZIDE,ZESTORETIC) 20-25 mg per tablet TAKE 1 TABLET BY MOUTH EVERY MORNING. 90 tablet 2     nitroglycerin (NITROSTAT) 0.4 MG SL tablet Place 1 tablet (0.4 mg total) under the tongue every 5 (five) minutes as needed for chest pain. 20 tablet 0     No current facility-administered medications for this visit.        Total Data Points: 1  MANDEEP Calles, CNP    This note has been dictated using voice recognition software. Any grammatical or context distortions are unintentional and inherent to the software

## 2021-06-11 NOTE — PROGRESS NOTES
ASSESSMENT:   1. Traumatic leg ulcer, right, limited to breakdown of skin     2. Leg swelling          PLAN:  Leg ulceration secondary to chronic leg swelling/venous stasis. There is no surrounding erythema, induration, or warmth - no evidence of infection.  He has no calf tenderness, erythema, or warmth suggestive of  DVT. Discussed good wound care: keep wound clean and dry, dress with thin layer of topical antibiotic ointment, nonadherent gauze, and gauze wrap. Instructed to change dressing twice daily.      Elevate legs throughout the day.  Decrease intake of salt and processed foods.    Recheck with your primary care provider in 2-3 days.  If redness, warmth, purulent drainage, pain of the leg, or fever/chills - go to the Emergency Room immediately.      SUBJECTIVE:   Sigifredo Shepard is a 46 y.o. male presents today with >1 year complaint of bilateral leg swelling.   He states swelling is actually improved over the past several months.  No abrupt change in leg swelling.  He reports chronic blistering that can come and go on the right anterior lower leg. 2 days ago, he noticed an itch on his right lower leg, that opened up when he scratched it with his opposite heel.  Now there is an open sore.   He is not having active drainage or redness.  No warmth.  He reports minimal pain of the area.     He admits he has gained approx 10-15 lbs in the past year or two due to decreased physical activity.  He states he does not monitor his salt intake. He works nights and stands most of his shift. He had an AMI in 2014.  He has not seen cardiology in several years.        Denies fever, chills. Denies drainage or warmth. Denies chest pain or shortness of breath.    Patient Active Problem List   Diagnosis     Acute myocardial infarction, unspecified site, episode of care unspecified     Hypercholesteremia     Obesity, unspecified     HTN (hypertension)       History   Smoking Status     Former Smoker   Smokeless Tobacco     Current  User     Comment: Quit 10/11/14 and uses E-cigarette daily        Current Medications:  Current Outpatient Prescriptions on File Prior to Visit   Medication Sig Dispense Refill     aspirin 81 MG EC tablet Take 81 mg by mouth daily.       atorvastatin (LIPITOR) 80 MG tablet Take 1 tablet (80 mg total) by mouth bedtime. 90 tablet 3     fluticasone (FLONASE) 50 mcg/actuation nasal spray 2 sprays in each nostril twice daily. 16 g 0     lisinopril-hydrochlorothiazide (PRINZIDE,ZESTORETIC) 20-25 mg per tablet TAKE 1 TABLET BY MOUTH EVERY MORNING. 90 tablet 2     nitroglycerin (NITROSTAT) 0.4 MG SL tablet Place 1 tablet (0.4 mg total) under the tongue every 5 (five) minutes as needed for chest pain. 20 tablet 0     lisinopril-hydrochlorothiazide (PRINZIDE,ZESTORETIC) 20-25 mg per tablet Take 1 tablet by mouth every morning. 90 tablet 3     No current facility-administered medications on file prior to visit.        Allergies:   Allergies   Allergen Reactions     Diltiazem      Slows heart rate (bradycardia)        OBJECTIVE:   Vitals:    06/06/17 1303   BP: 146/90   Pulse: 85   Resp: 20   Temp: 97.9  F (36.6  C)   TempSrc: Oral   SpO2: 97%   Weight: (!) 311 lb 6.4 oz (141.3 kg)     Physical exam reveals a46 y.o. male.   Appears healthy, alert, cooperative and in NAD.  Lungs: Chest is clear, no wheezing, rhonchi or rales. Symmetric air entry throughout both lung fields.  Heart: regular rate and rhythm, no murmur, rub or gallop  Extremity: dorsal pedis and posterior tibialis pulses intact bilaterally. 2+ pitting edema bilaterally. 3cm x2cm open sore on his right lower leg,  above the ankle.  There is a flap of tissue bunched up inferiorly that I removed with an iris scissors.  The wound tissue appears healthy. There is no surrounding erythema, drainage, or red streaking.  No warmth of the leg.  No tenderness of the calf or lower leg. He has a tan line where his socks reach the ankles bilaterally, though there is no redness or  induration.

## 2021-06-14 NOTE — PROGRESS NOTES
Chief Complaint   Patient presents with     Shoulder Pain     ongoing - seen a few people for this - told  it was a pinched nerve        HPI:    Patient is here for about 2 hrs of right shoulder pain, constant with a mild pain, but becomes moderate with ROM, radiating down to right proximal upper arm. No recalled injury. No fever, chills, tingling/numbess of right arm. He took 400 mg Ibuprofen without relief.    ROS: Pertinent ROS noted in HPI.     Allergies   Allergen Reactions     Diltiazem      Slows heart rate (bradycardia)        Patient Active Problem List   Diagnosis     Acute myocardial infarction, unspecified site, episode of care unspecified     Hypercholesteremia     Obesity, unspecified     HTN (hypertension)       Family History   Problem Relation Age of Onset     Heart disease Maternal Grandfather      Diabetes Mother      Hyperlipidemia Mother      Hypertension Mother      Heart disease Father      four heart attacks. first at age 49     Diabetes Father      Hyperlipidemia Father        Social History     Social History     Marital status:      Spouse name: N/A     Number of children: N/A     Years of education: N/A     Occupational History     Not on file.     Social History Main Topics     Smoking status: Current Every Day Smoker     Smokeless tobacco: Current User      Comment: Quit 10/11/14 and uses E-cigarette daily      Alcohol use No     Drug use: No     Sexual activity: Not on file     Other Topics Concern     Not on file     Social History Narrative         Objective:    Vitals:    11/29/17 1603   BP: 118/76   Pulse: 90   Resp: 16   Temp: 97.9  F (36.6  C)   SpO2: 98%       Gen:NAD  CV:RRR, no M, R, G  Pulm: CTAB  MSK: normal inspection of shoulders, b/l UEs. Mild TTP at right AC joint and deltoid area. Full ROM and strength of shoulders and b/l UEs, but with lots of pain above raising right arm over shoulder. 5/5 strength of b/l UEs.      Chronic right shoulder pain  -     predniSONE  (DELTASONE) 20 MG tablet; Take 2 tablets (40 mg total) by mouth daily for 5 days.      Rule out impingement/rotator cuff pathology. F/u as directed.

## 2021-06-14 NOTE — PROGRESS NOTES
Chief Complaint   Patient presents with     Fall     to the left ankle. pt slipped on ice this AM, and felt a popped to the area. Admit pain         HPI:    Patient is here for left ankle injury occurred this AM after he slipped on ice and injured left ankle. He felt a pop on left ankle. Pain is severe with weight bearing. He has been using his crutches to get around. No pain at rest. No head injury or pain at other body sites. No fever, tingling/numbness of lower extremities.     ROS: Pertinent ROS noted in HPI.     Allergies   Allergen Reactions     Diltiazem      Slows heart rate (bradycardia)      Patient Active Problem List   Diagnosis     Acute myocardial infarction, unspecified site, episode of care unspecified     Hypercholesteremia     Obesity, unspecified     HTN (hypertension)       Family History   Problem Relation Age of Onset     Heart disease Maternal Grandfather      Diabetes Mother      Hyperlipidemia Mother      Hypertension Mother      Heart disease Father      four heart attacks. first at age 49     Diabetes Father      Hyperlipidemia Father        Social History     Social History     Marital status:      Spouse name: N/A     Number of children: N/A     Years of education: N/A     Occupational History     Not on file.     Social History Main Topics     Smoking status: Current Every Day Smoker     Smokeless tobacco: Current User      Comment: Quit 10/11/14 and uses E-cigarette daily      Alcohol use No     Drug use: No     Sexual activity: Not on file     Other Topics Concern     Not on file     Social History Narrative         Objective:    Vitals:    12/19/17 0803   BP: 124/80   Pulse: (!) 102   Resp: 14   Temp: 97.7  F (36.5  C)   SpO2: 96%       Gen:NAD  MSK: mild edema without erythema of left lateral ankle with moderate paint to palpation anterior to the left lateral malleolus. Reduced ROM of left ankle in all planes due to pain. Reduced resisted inversion and eversion of left ankle.  Normal strength of left ankle with extension and flexion. Normal palpation of left lower leg, foot and toes. Unable to assess gait as patient was hesitant to bear weight on left foot.  Neuro: intact and symmetric gross sensation of feet.       Xr Ankle Left 3 Or More Vws    Result Date: 12/19/2017  EXAM DATE:         12/19/2017 [facility] X-RAY ANKLE LEFT, MINIMUM THREE VIEWS 12/19/2017 8:30 AM INDICATION: INJURY TO LEFT ANKLE WITH PAIN AT LATERAL MALLEOLU COMPARISON: None. FINDINGS: Mild soft tissue swelling medially. Minimal soft tissue swelling laterally. 5 mm bone fragment projecting between the lateral malleolus and talus could be acute or chronic. Unfused os trigonum.     XR - ordered and reviewed by me, noting a bone fragment between talus and lateral malleolus, discussed during visit.        Left ankle injury, initial encounter  -     XR Ankle Left 3 or More VWS      Concern for acute vs chronic avulsion fracture per XR. Recommended f/u with [facility]. Non-weight bearing with crutches for ambulation (patient has crutches). Supportive cares as directed.

## 2021-06-14 NOTE — PROGRESS NOTES
NATE Shepard is a 47 y.o. male here sore throat.  His son was diagnosed with strep and he wants to make sure he does not have it.  He has had no fevers.  Mild cough and sneezing.  Past Medical History:   Diagnosis Date     CAD (coronary artery disease)      HTN (hypertension)      MI (myocardial infarction)      Toe contusion      Current Outpatient Prescriptions on File Prior to Visit   Medication Sig Dispense Refill     aspirin 81 MG EC tablet Take 81 mg by mouth daily.       atorvastatin (LIPITOR) 80 MG tablet Take 1 tablet (80 mg total) by mouth at bedtime. 90 tablet 0     fluticasone (FLONASE) 50 mcg/actuation nasal spray 2 sprays in each nostril twice daily. 16 g 0     lisinopril-hydrochlorothiazide (PRINZIDE,ZESTORETIC) 20-25 mg per tablet TAKE 1 TABLET BY MOUTH EVERY MORNING. 90 tablet 0     predniSONE (DELTASONE) 20 MG tablet Take 2 tablets (40 mg total) by mouth daily for 5 days. 10 tablet 0     No current facility-administered medications on file prior to visit.      ?  ROS:   General: No fevers, chills  Ears: No pain  Oropharynx: +sore throat  CV: No chest pain  Resp: No shortness of breath or cough.  ?  O  /80  Pulse (!) 103  Temp 98.1  F (36.7  C) (Oral)   Resp 18  Wt (!) 307 lb (139.3 kg)  SpO2 95%  BMI 46.68 kg/m2   Vitals reviewed. Nursing note reviewed.  General Appearance: Pleasant and alert, in no acute distress  HEENT: TMs clear, oropharynx without edema or exudate, mucous membranes moist, neck supple, no lymphadenopathy.  Mild mattering around eyes.  CV: RRR, no murmur, rubs, gallops  Resp: No respiratory distress. Clear to auscultation bilaterally. No wheezes, rales, rhonchi  Skin: warm, dry, intact, no rash noted  Neuro: no focal deficits, CNs II-XII normal.   A/P  Sigifredo was seen today for sore throat.    Diagnoses and all orders for this visit:    Sore throat- negative. Likely has viral illness causing throat pain.   -     Rapid Strep A Screen-Throat  -     Group A Strep, RNA  Direct Detection, Throat         Options for treatment and follow-up care were reviewed with the patient and/or guardian. Sigifredo Shepard and/or guardian engaged in the decision making process and verbalized understanding of the options discussed and agreed with the final plan.    Julia Toussaint MD

## 2021-06-17 NOTE — PATIENT INSTRUCTIONS - HE
Patient Instructions by Shahnaz Dorsey CNP at 2/11/2019  7:30 AM     Author: Shahnaz Dorsey CNP Service: -- Author Type: Nurse Practitioner    Filed: 2/11/2019  7:58 AM Encounter Date: 2/11/2019 Status: Addendum    : Shahnaz Dorsey CNP (Nurse Practitioner)    Related Notes: Original Note by Shahnaz Dorsey CNP (Nurse Practitioner) filed at 2/11/2019  7:54 AM       Patient to follow up with Primary Care provider regarding elevated blood pressure.      Patient Education     Relieving Back Pain  Back pain is a common problem. You can strain back muscles by lifting too much weight or just by moving the wrong way. Back strain can be uncomfortable, even painful. And it can take weeks or months to improve. To help yourself feel better and prevent future back strains, try these tips.  Important Note: Do not give aspirin to children or teens without first discussing it with your healthcare provider.      ? Ice    Ice reduces muscle pain and swelling. It helps most during the first 24 to 48 hours after an injury.    Wrap an ice pack or a bag of frozen peas in a thin towel. (Never place ice directly on your skin.)    Place the ice where your back hurts the most.    Dont ice for more than 20 minutes at a time.    You can use ice several times a day.  ? Medicines  Over-the-counter pain relievers can include acetaminophen and anti-inflammatory medicines, which includes aspirin or ibuprofen. They can help ease discomfort. Some also reduce swelling.    Tell your healthcare provider about any medicines you are already taking.    Take medicines only as directed.  ? Heat  After the first 48 hours, heat can relax sore muscles and improve blood flow.    Try a warm bath or shower. Or use a heating pad set on low. To prevent a burn, keep a cloth between you and the heating pad.    Dont use a heating pad for more than 15 minutes at a time. Never sleep on a heating pad.  Date Last Reviewed: 9/1/2015 2000-2017 [employer]. [address] [place] [id number]. All rights reserved. This information is not intended as a substitute for professional medical care. Always follow your healthcare professional's instructions.           Patient Education     Understanding Osteoarthritis of the Hip    A joint is a place where two bones meet. The hip is a ball-and-socket joint. It is formed where the ball at the top of the thighbone (femur) rests in the socket in the pelvic bone. The hip joint allows the leg to move freely in many directions.  Hip osteoarthritis is a condition where parts of the hip joint wear out. It can lead to pain, stiffness, and limited movement.   What is osteoarthritis?  All joints contain a smooth tissue called cartilage. Cartilage cushions the ends of bones, helping them glide against each other. Hip osteoarthritis occurs when cartilage in the hip joint begins to break down and wear away. The ball and socket bones may then become exposed and rub together. The cartilage may become rough and pitted and may begin to wear away. This prevents smooth movement of the joint and can lead to pain.  Causes of osteoarthritis of the hip  Causes can include:    Wear and tear from normal use of the hip over time    Overuse of the hip during sports or work activities    Being overweight. This increases stress on the hip joint.    Injury to the hip    Infection of the hip joint  Symptoms of osteoarthritis of the hip  The main symptom of hip osteoarthritis is pain. The pain is most often felt in the groin area. It may also travel down the leg to the knee or to the back of the hip. The pain usually gets worse with activity, such as walking or climbing stairs. The pain may get better with rest. The joint may also be stiff first thing in the morning or after periods of sitting or lying down. Stiffness usually gets better with movement.  Treating osteoarthritis of the hip  Osteoarthritis is a long-term condition.  Treatment usually focuses on managing symptoms. Treatment may include:    Over-the-counter or prescription medicines taken by mouth to help relieve pain and swelling    Injections of medicine into the joint to help relieve symptoms for a time    A weight-loss plan if you are overweight    A plan of physical therapy and exercises to improve strength and flexibility around the joint    Cold or heat packs help relieve pain and stiffness    Assistive devices that help with movement, such as a cane or a walker    Assistive devices that make activities of daily life easier, such as raised toilet seats or shower bars  If other treatments dont do enough to relieve severe symptoms, you may need surgery to replace the joint. This surgery replaces the hip joint with an artificial joint. It can help relieve pain and stiffness and improve function of the hip.     When to call your healthcare provider  Call your healthcare provider right away if you have any of these:    Fever of 100.4 F (38 C) or higher, or as directed    Symptoms that dont get better with prescribed medicines or get worse    New symptoms   Date Last Reviewed: 3/10/2016    0113-4704 [employer]. [address] [place] [address], [place] [address]. All rights reserved. This information is not intended as a substitute for professional medical care. Always follow your healthcare professional's instructions.

## 2021-06-17 NOTE — PATIENT INSTRUCTIONS - HE
Patient Instructions by Carina Carter MD at 2/15/2019 10:15 AM     Author: Carina Carter MD Service: -- Author Type: Physician    Filed: 2/15/2019 10:31 AM Encounter Date: 2/15/2019 Status: Signed    : Carina Carter MD (Physician)         Patient Education     Back Exercises: Hip Lift        To start, lie on your back with your knees bent and feet flat on the floor. Dont press your neck or lower back to the floor. Breathe deeply. You should feel comfortable and relaxed in this position:    Tighten your abdomen and buttocks.    Slowly raise your hips upward. Be careful not to arch your back.    Hold for 5 seconds. Lower your hips to the floor.    Repeat 10 times.  For your safety, check with your healthcare provider before starting an exercise program.   Date Last Reviewed: 8/16/2015 2000-2016 [employer]. [address] [place] [address]. All rights reserved. This information is not intended as a substitute for professional medical care. Always follow your healthcare professional's instructions.           Patient Education     Back Exercises: Hip Rotator Stretch    To start, lie on your back with your knees bent and feet flat on the floor. Dont press your neck or lower back to the floor. Breathe deeply. You should feel comfortable and relaxed in this position.    Rest your right ankle on your left knee.    Place a towel behind your left thigh, and use it to pull the knee toward your chest. Feel the stretch in your buttocks.    Hold for 30 to 60 seconds. Release.    Repeat 2 times.    Switch legs.     If there is any pain other than stretch in the knee or buttock, stop and contact your healthcare provider.  For your safety, check with your healthcare provider before starting an exercise program.   Date Last Reviewed: 8/16/2015 2000-2017 [employer]. [address] [place] [address]. All rights reserved. This information is not intended as a substitute  for professional medical care. Always follow your healthcare professional's instructions.           Patient Education     Back Exercises: Leg Reach         Do this exercise on your hands and knees. Keep your knees under your hips and your hands under your shoulders. Keep your spine in a neutral position (not arched or sagging). Be sure to maintain your necks natural curve:    Extend one leg straight back. Dont arch your back or let your head or body sag.    Hold for 5 seconds. Return to starting position.    Repeat 5 times.    Switch legs.   Date Last Reviewed: 8/16/2015 2000-2017 [employer]. [address] [place] [address]. All rights reserved. This information is not intended as a substitute for professional medical care. Always follow your healthcare professional's instructions.           Patient Education     Back Exercises: Lower Back Rotation    To start, lie on your back with your knees bent and feet flat on the floor. Dont press your neck or lower back to the floor. Breathe deeply. You should feel comfortable and relaxed in this position.    Drop both knees to one side. Turn your head to the other side. Keep your shoulders flat on the floor.    Do not push through pain.    Hold for 20 seconds.    Slowly switch sides.    Repeat 2 to 5 times.  Date Last Reviewed: 10/11/2015    3269-7282 [employer]. [address] [place] [address]. All rights reserved. This information is not intended as a substitute for professional medical care. Always follow your healthcare professional's instructions.           Patient Education     Back Exercises: Side Stretch    To start, sit in a chair with your feet flat on the floor. Shift your weight slightly forward to avoid rounding your back. Relax. Keep your ears, shoulders, and hips aligned:    Stretch your right arm overhead.    Slowly bend to the left. Dont twist your torso. Stay within your pain limits.    Hold  for 20 seconds. Return to starting position.    Repeat 2 to 5 times. Then, switch to the other side.  Date Last Reviewed: 10/13/2015    7093-8050 [employer]. [address] [place] [address], [place] [address]. All rights reserved. This information is not intended as a substitute for professional medical care. Always follow your healthcare professional's instructions.

## 2021-06-17 NOTE — PROGRESS NOTES
Family Medicine Office Visit  [facility]  Patient Name: Sigifredo Shepard  Patient Age: 47 y.o.  YOB: 1970  MRN: [id number]    Date of Visit: 2018  Reason for Office Visit:   Chief Complaint   Patient presents with     Establish Care     Had an x-ray on the right shoulder, and during the x-ray there was a wire broken in his chest. Had double bypass in . Is wanting to have this repaired.      Apnea     C-pap not working. Is needing a rx for a new machine.      Ankle Injury           Assessment / Plan / Medical Decision Makin. Essential hypertension  - Ambulatory referral to Cardiology    2. Obesity    3. History of MI - s/p CABG x2  - Ambulatory referral to Cardiology    4. Sleep apnea  - Ambulatory referral to Sleep Medicine        Health Maintenance Review  Health Maintenance   Topic Date Due     DIABETES FOLLOW-UP  1970     DIABETES FOOT EXAM  1982     DIABETES OPHTHALMOLOGY EXAM  1982     DIABETES URINE MICROALBUMIN  1982     ADVANCE DIRECTIVES DISCUSSED WITH PATIENT  1990     DIABETES HEMOGLOBIN A1C  2016     INFLUENZA VACCINE RULE BASED (1) 2017     TD 18+ HE  2025     TDAP ADULT ONE TIME DOSE  Completed         I am having Mr. Shepard maintain his aspirin, fluticasone, atorvastatin, and lisinopril-hydrochlorothiazide.      HPI:  Sigifredo Shepard is a 47 y.o. year old who presents to the office today for establish care.  Pt has a hx of CABG x2, HTN, CAD that has been well controlled.  Would like to establish with new cardiologist.  Episode of chest pain on 4/15/18 during the snow storm when pulling trash cans up the driveway and became SOB with some left arm pain.  Stress test done and noted to be normal.       Been seen at [facility] for ankle pain and right shoulder pain.  On recent XR he says that one of his sternal clips has broken off.  At times states that when he moves he can feel the clip  poking through his scar.       Hx of sleep apnea and currently using CPAP but it is broken.  States that the humidifier on the machine is broken and causing issues.  Hx of nasal polyps and seen by ENT and supposed to have surgery but not scheduled yet.         Review of Systems- pertinent positive in bold:  Constitutional: Fever, chills, night sweats, fainting, weight change, fatigue, seizures, dizziness, sleeping difficulties, loud snoring/pauses in breathing  Eyes: change in vision, blurred or double vision, redness/eye pain  Ears, nose, mouth, throat: change in hearing, ear pain, hoarseness, difficulty swallowing, sores in the mouth or throat  Respiratory: shortness of breath, cough, bloody sputum, wheezing  Cardiovascular: chest pain, palpitations   Gastrointestinal: abdominal pain, heartburn/indigestion, nausea/vomiting, change in appetite, change in bowel habits, constipation or diarrhea, rectal bleeding/dark stools, difficulty swallowing  Urinary: painful urination, frequent urination, urinary urgency/incontinence, blood in urine/dark urine, nocturia  Musculoskeletal: backache/back pain (new or increasing), weakness, joint pain/stiffness (new or increasing), muscle cramps, swelling of hands, feet, ankles, leg pain/redness  Skin: change in moles/freckles, rash, nodules  Hematologic/lymphatic: swollen lymph glands, abnormal bruising/bleeding  Endocrine: excessive thirst/urination, cold or heat intolerance  Neurologic/emotional: worrisome memory change, numbness/tingling, anxiety, mood swings      Current Scheduled Meds:  Outpatient Encounter Prescriptions as of 4/24/2018   Medication Sig Dispense Refill     aspirin 81 MG EC tablet Take 81 mg by mouth daily.       atorvastatin (LIPITOR) 80 MG tablet Take 1 tablet (80 mg total) by mouth at bedtime. 90 tablet 0     fluticasone (FLONASE) 50 mcg/actuation nasal spray 2 sprays in each nostril twice daily. 16 g 0     lisinopril-hydrochlorothiazide  "(PRINZIDE,ZESTORETIC) 20-25 mg per tablet Take 1 tablet by mouth every morning. 30 tablet 2     No facility-administered encounter medications on file as of 4/24/2018.      Past Medical History:   Diagnosis Date     CAD (coronary artery disease)      HTN (hypertension)      MI (myocardial infarction)      Toe contusion      Past Surgical History:   Procedure Laterality Date     CORONARY STENT PLACEMENT  10/09/2014     [place] CABG, VEIN, SINGLE      Description: CABG (CABG);  Recorded: 11/12/2014;  Comments: 2009     [place] REMOVAL OF SPERM DUCT(S)      Description: Surgery Of Male Genitalia Vasectomy;  Recorded: 11/12/2014;  Comments: 2009     Social History   Substance Use Topics     Smoking status: Current Every Day Smoker     Smokeless tobacco: Current User      Comment: Quit 10/11/14 and uses E-cigarette daily      Alcohol use No       Objective / Physical Examination:  Vitals:    04/24/18 1804   BP: 132/80   Pulse: 98   Weight: (!) 314 lb (142.4 kg)   Height: 5' 9\" (1.753 m)     Wt Readings from Last 3 Encounters:   04/24/18 (!) 314 lb (142.4 kg)   01/09/18 (!) 305 lb (138.3 kg)   12/19/17 (!) 305 lb (138.3 kg)     BP Readings from Last 3 Encounters:   04/24/18 132/80   01/09/18 145/82   12/19/17 (!) 128/94     Body mass index is 46.37 kg/(m^2).     General Appearance: Alert and oriented, cooperative, affect appropriate, speech clear, in no apparent distress  Head: Normocephalic, atraumatic  Ears: Tympanic membrane clear with landmarks well visualized bilaterally  Eyes: PERRL, fundi appear clear bilaterally. EOMI. Conjunctivae clear and sclerae non-icteric  Nose: Septum midline, nares patent, no visible polyps, mucosa moist and without drainage  Throat: Lips and mucosa moist. Teeth in good repair, pharynx without erythema or exudate  Neck: Supple, trachea midline. No cervical adenopathy  Back: Symmetrical and nontender  Lungs: Clear to auscultation bilaterally. Normal inspiratory and expiratory effort  Cardiovascular: " Regular rate, normal S1, S2. No murmurs, rubs, or gallops  Abdomen: Bowel sounds active all four quadrants. Soft, non-tender. No hepatomegaly or splenomegaly. No bruits detected.   Extremities: Pulses 2+ and equal throughout. No edema. Strength equal throughout.  Integumentary: Warm and dry. Without suspicious looking lesions  Neuro: Alert and oriented, follows commands appropriately.     Orders Placed This Encounter   Procedures     Ambulatory referral to Cardiology     Ambulatory referral to Sleep Medicine   Followup: No Follow-up on file. earlier if needed.        Carina Carter MD

## 2021-06-18 NOTE — LETTER
Letter by Carina Carter MD at      Author: Carina Carter MD Service: -- Author Type: --    Filed:  Encounter Date: 2/15/2019 Status: (Other)       February 15, 2019     Patient: Sigifredo Shepard   YOB: 1970   Date of Visit: 2/15/2019       To Whom It May Concern:    It is my medical opinion that Sigifredo Shepard should remain out of work until 2/18/19.    If you have any questions or concerns, please don't hesitate to call.    Sincerely,        Electronically signed by Carina Carter MD

## 2021-06-18 NOTE — PROGRESS NOTES
Family Medicine Office Visit  [facility]  Patient Name: Sigifredo Shepard  Patient Age: 47 y.o.  YOB: 1970  MRN: [id number]    Date of Visit: 2018  Reason for Office Visit:   Chief Complaint   Patient presents with     Follow-up           Assessment / Plan / Medical Decision Makin. HTN (hypertension) - stable  Refill medication   - lisinopril-hydrochlorothiazide (PRINZIDE,ZESTORETIC) 20-25 mg per tablet; Take 1 tablet by mouth every morning.  Dispense: 90 tablet; Refill: 3    2. Sebaceous cyst  Numbed the area with 1% lidocaine, lanced the area with 10 blade and clear fluid expressed.  Pt tolerated procedure well, bacitracin applied, bleeding stopped, and bandaged.    3. S/P CABG x 2 - well controlled  Follow up as per cardiology    4. Obstructive sleep apnea - stable  Follow up as per sleep medicine    5. CAD (coronary artery disease) - well controlled  Follow up as per cardiology    6. Nasal polyp  Keep appointment with ENT for July        Health Maintenance Review  Health Maintenance   Topic Date Due     DIABETES FOOT EXAM  1982     DIABETES OPHTHALMOLOGY EXAM  1982     DIABETES URINE MICROALBUMIN  1982     ADVANCE DIRECTIVES DISCUSSED WITH PATIENT  1990     DIABETES HEMOGLOBIN A1C  2016     INFLUENZA VACCINE RULE BASED (Season Ended) 2018     TD 18+ HE  2025     TDAP ADULT ONE TIME DOSE  Completed     DIABETES FOLLOW-UP  Excluded         I am having Mr. Shepard maintain his aspirin, fluticasone, atorvastatin, nitroglycerin, and lisinopril-hydrochlorothiazide.      HPI:  Sigifredo Shepard is a 47 y.o. year old who presents to the office today for follow up.  Hx of CABG x2 and seen by cardiology - doing well.  Recent labs done there.  In Chart notes DM, but pt denies ever being dx with Diabetes, no medications and does not check sugars.  Last A1c done at [facility] and noted to be 5.5 in 2018.  Pt seen by sleep medicine  and ordered CPAP which he has yet to receive.  Scheduled to see ENT for nasal polyps in July     Area on right index finger - thought may have gotten a sliver of something in the skin and now formed a small cyst.  Pain with pushing on it only.  No drainage, no surrounding erythema.  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        Review of Systems- pertinent positive in bold:  Constitutional: Fever, chills, night sweats, fainting, weight change, fatigue, seizures, dizziness, sleeping difficulties, loud snoring/pauses in breathing  Eyes: change in vision, blurred or double vision, redness/eye pain  Ears, nose, mouth, throat: change in hearing, ear pain, hoarseness, difficulty swallowing, sores in the mouth or throat  Respiratory: shortness of breath, cough, bloody sputum, wheezing  Cardiovascular: chest pain, palpitations   Gastrointestinal: abdominal pain, heartburn/indigestion, nausea/vomiting, change in appetite, change in bowel habits, constipation or diarrhea, rectal bleeding/dark stools, difficulty swallowing  Urinary: painful urination, frequent urination, urinary urgency/incontinence, blood in urine/dark urine, nocturia  Musculoskeletal: backache/back pain (new or increasing), weakness, joint pain/stiffness (new or increasing), muscle cramps, swelling of hands, feet, ankles, leg pain/redness  Skin: change in moles/freckles, rash, nodules  Hematologic/lymphatic: swollen lymph glands, abnormal bruising/bleeding  Endocrine: excessive thirst/urination, cold or heat intolerance  Neurologic/emotional: worrisome memory change, numbness/tingling, anxiety, mood swings      Current Scheduled Meds:  Outpatient Encounter Prescriptions as of 6/26/2018   Medication Sig Dispense Refill     aspirin 81 MG EC tablet Take 81 mg by mouth daily.       atorvastatin (LIPITOR) 80 MG tablet Take 1 tablet (80 mg total) by mouth at bedtime. 90 tablet 0     fluticasone (FLONASE) 50 mcg/actuation nasal spray 2  "sprays in each nostril twice daily. 16 g 0     lisinopril-hydrochlorothiazide (PRINZIDE,ZESTORETIC) 20-25 mg per tablet Take 1 tablet by mouth every morning. 90 tablet 3     nitroglycerin (NITROSTAT) 0.4 MG SL tablet Place 0.4 mg under the tongue as needed.       [DISCONTINUED] lisinopril-hydrochlorothiazide (PRINZIDE,ZESTORETIC) 20-25 mg per tablet Take 1 tablet by mouth every morning. 30 tablet 2     No facility-administered encounter medications on file as of 6/26/2018.      Past Medical History:   Diagnosis Date     CAD (coronary artery disease)      HTN (hypertension)      MI (myocardial infarction)      Toe contusion      Past Surgical History:   Procedure Laterality Date     CORONARY STENT PLACEMENT  10/09/2014     [place] CABG, VEIN, SINGLE      Description: CABG (CABG);  Recorded: 11/12/2014;  Comments: 2009     OR REMOVAL OF SPERM DUCT(S)      Description: Surgery Of Male Genitalia Vasectomy;  Recorded: 11/12/2014;  Comments: 2009     Social History   Substance Use Topics     Smoking status: Former Smoker     Smokeless tobacco: Current User      Comment: Quit 10/11/14 and uses E-cigarette daily      Alcohol use No       Objective / Physical Examination:  Vitals:    06/26/18 1626   BP: 134/82   Pulse: 84   Weight: (!) 317 lb (143.8 kg)   Height: 5' 9\" (1.753 m)     Wt Readings from Last 3 Encounters:   06/26/18 (!) 317 lb (143.8 kg)   06/21/18 (!) 320 lb (145.2 kg)   05/17/18 (!) 311 lb (141.1 kg)     BP Readings from Last 3 Encounters:   06/26/18 134/82   06/21/18 132/74   05/17/18 134/80       Recent labs  Chem glucose 113 A1c 5.5  Chem Common BUN 18 Na 137K 4.1 Cl 105 CO2 23 Cr .78  Lipid chol 173 TG 80 HDL 36     Body mass index is 46.81 kg/(m^2).     General Appearance: Alert and oriented, cooperative, affect appropriate, speech clear, in no apparent distress  Head: Normocephalic, atraumatic  Ears: Tympanic membrane clear with landmarks well visualized bilaterally  Eyes: PERRL, fundi appear clear " bilaterally. EOMI. Conjunctivae clear and sclerae non-icteric  Nose: Septum midline, nares patent, no visible polyps, mucosa moist and without drainage  Throat: Lips and mucosa moist. Teeth in good repair, pharynx without erythema or exudate  Neck: Supple, trachea midline. No cervical adenopathy  Back: Symmetrical and nontender  Lungs: Clear to auscultation bilaterally. Normal inspiratory and expiratory effort  Cardiovascular: Regular rate, normal S1, S2. No murmurs, rubs, or gallops  Abdomen: Bowel sounds active all four quadrants. Soft, non-tender. No hepatomegaly or splenomegaly. No bruits detected.   Extremities: Pulses 2+ and equal throughout. No edema. Strength equal throughout.  Integumentary: Warm and dry. Without suspicious looking lesions  Neuro: Alert and oriented, follows commands appropriately.     No orders of the defined types were placed in this encounter.  Followup: No Follow-up on file. earlier if needed.          Carina Carter MD

## 2021-06-18 NOTE — LETTER
Letter by Carina Carter MD at      Author: Carina Carter MD Service: -- Author Type: --    Filed:  Encounter Date: 2/15/2019 Status: (Other)       Sigifredo Shepard  [address] [facility] [address]             February 15, 2019         Dear Mr. Shepard,    Below are the results from your recent visit:    Resulted Orders   XR Hip Right 2 or More VWS    Narrative    EXAM DATE:         02/15/2019    [facility] RADIOLOGY    EXAM: X-RAY HIP, RIGHT, MINIMUM 2 VIEWS  LOCATION: [facility]  DATE/TIME: 2/15/2019 10:45 AM    INDICATION: Lumbago with sciatica, right side  COMPARISON: CT of the abdomen pelvis dated 2/2/2011.    FINDINGS: Patchy areas of sclerosis involving the bilateral femoral heads is  nonspecific but favor degenerative. Mild to moderate joint space narrowing  involving the right hip. No evidence of fracture or dislocation.    Degenerative changes involving the right greater than left SI joints.                    Mild to moderate arthritis seen on XR.  Can try the stretching exercises and if no improvement then  recommend referral to orthopedics to see if an injection is appropriate.        Please call with questions or contact us using [employer].    Sincerely,        Electronically signed by Carina Carter MD

## 2021-06-18 NOTE — LETTER
Letter by Shahnaz Dorsey CNP at      Author: Shahnaz Dorsey CNP Service: -- Author Type: --    Filed:  Encounter Date: 2/11/2019 Status: (Other)       February 11, 2019     Patient: Sigifredo Shepard   YOB: 1970   Date of Visit: 2/11/2019       To Whom it May Concern:    Sigifredo Shepard was seen in my clinic on 2/11/2019.  Please allow 4 hour shifts this week until 2/15/2019 with 10 lb lifting restriction.  No bending at the waist to lift.     If you have any questions or concerns, please don't hesitate to call.    Sincerely,         Electronically signed by Shahnaz Dorsey CNP

## 2021-06-18 NOTE — PROGRESS NOTES
Dear  Carina Carter Md  [address] [place] [address]    Thank you for the opportunity to participate in the care of  Sigifredo Shepard.    He is a 47 y.o. y/o who comes to the clinic to establish care for his sleep apnea.    The patient was diagnosed with DOMINGUEZ approximately more than 10 years ago. A PSG test was completed at [facility] and showed and AHI of 73 events per hour.  Prior to the diagnosis of DOMINGUEZ he was experiencing snoring and witnessed apneas. CPAP was initiated after the test and the patient recalls having some improvement in his symptoms.     The patient has not used any form of therapy during the last 5 years because he did not have any parts for his device. He attempted to get parts from his DME but there was a pending account and he could not get it through the initial company.    Currently, he reports that hs is snoring every night. The snoring is very loud. He is also very tired.     Current DME provider:none  Current device:S8 compact  Current settings: P= 7 cwp  Current mask: none  Last time supplies were received:more than 5 years ago.     Current AHI: n/a  Current compliance: 0%    The patient has a history of CAD with two MIs in the past (2009 and 2014).     The patient also has a history of nasal polyps and was considering surgery but at that time he was using a blood thinner and surgery was not completed.     Past Medical History  Past Medical History:   Diagnosis Date     CAD (coronary artery disease)      HTN (hypertension)      MI (myocardial infarction)      Toe contusion         Past Surgical History  Past Surgical History:   Procedure Laterality Date     CORONARY STENT PLACEMENT  10/09/2014     [place] CABG, VEIN, SINGLE      Description: CABG (CABG);  Recorded: 11/12/2014;  Comments: 2009     [place] REMOVAL OF SPERM DUCT(S)      Description: Surgery Of Male Genitalia Vasectomy;  Recorded: 11/12/2014;  Comments: 2009        Meds  Current Outpatient Prescriptions   Medication Sig Dispense  Refill     aspirin 81 MG EC tablet Take 81 mg by mouth daily.       fluticasone (FLONASE) 50 mcg/actuation nasal spray 2 sprays in each nostril twice daily. 16 g 0     lisinopril-hydrochlorothiazide (PRINZIDE,ZESTORETIC) 20-25 mg per tablet Take 1 tablet by mouth every morning. 30 tablet 2     atorvastatin (LIPITOR) 80 MG tablet Take 1 tablet (80 mg total) by mouth at bedtime. 90 tablet 0     nitroglycerin (NITROSTAT) 0.4 MG SL tablet Place 0.4 mg under the tongue as needed.       No current facility-administered medications for this visit.         Allergies  Diltiazem     Social History  Social History     Social History     Marital status:      Spouse name: N/A     Number of children: N/A     Years of education: N/A     Occupational History     Not on file.     Social History Main Topics     Smoking status: Former Smoker     Smokeless tobacco: Current User      Comment: Quit 10/11/14 and uses E-cigarette daily      Alcohol use No     Drug use: No     Sexual activity: Not on file     Other Topics Concern     Not on file     Social History Narrative        Family History  Family History   Problem Relation Age of Onset     Heart disease Maternal Grandfather      Diabetes Mother      Hyperlipidemia Mother      Hypertension Mother      Heart disease Father      four heart attacks. first at age 49     Diabetes Father      Hyperlipidemia Father      Sleep apnea Father      Snoring Father      Sleep apnea Brother      Snoring Brother      Sleep apnea Brother      Snoring Brother            Review of Systems:  Constitutional: Negative except as noted in HPI.   Eyes: Negative except as noted in HPI.   ENT: Negative except as noted in HPI.   Cardiovascular: Negative except as noted in HPI.   Respiratory: Negative except as noted in HPI.   Gastrointestinal: Negative except as noted in HPI.   Genitourinary: Negative except as noted in HPI.   Musculoskeletal: Negative except as noted in HPI.   Integumentary: Negative  "except as noted in HPI.   Neurological: Negative except as noted in HPI.   Psychiatric: Negative except as noted in HPI.   Endocrine: Negative except as noted in HPI.   Hematologic/Lymphatic: Negative except as noted in HPI.      Physical Exam:  /74  Pulse 93  Ht 5' 9\" (1.753 m)  Wt (!) 320 lb (145.2 kg)  SpO2 96%  BMI 47.26 kg/m2  BMI:Body mass index is 47.26 kg/(m^2).   GEN: NAD, obese.  Head: Normocephalic.  EYES: PERRLA, EOMI    ENT:  Ears    Nose.    External Inspection:  Inflammation: no, Deformity: no,Discharge: no, Bleeding: no    Internal Inspection:   Color: pink, Edema: no, Deviated or perforated septum:no, Polyps: yes (right side), Bleeding: no, Breathing pattern: nasal.  Dynamic collapse with forced inspiration: yes    Mouth and Throat  Inspection  Lips: lesions no;  symmetry normal  Mallapmati class: IV  Uvula is edematous      Neurological: Alert, oriented to time, place, and person.  Psych:  normal mood, normal affect     Labs/Studies:     Lab Results   Component Value Date    WBC 8.5 06/18/2010    HGB 14.6 06/18/2010    HCT 41.8 06/18/2010    MCV 93 06/18/2010     06/18/2010         Chemistry        Component Value Date/Time     08/31/2016 0858    K 3.9 08/31/2016 0858     08/31/2016 0858    CO2 21 (L) 08/31/2016 0858    BUN 10 08/31/2016 0858    CREATININE 0.75 08/31/2016 0858    GLU 88 08/31/2016 0858        Component Value Date/Time    CALCIUM 9.3 08/31/2016 0858    ALKPHOS 70 08/31/2016 0858    AST 26 08/31/2016 0858    ALT 41 08/31/2016 0858    BILITOT 0.6 08/31/2016 0858            No results found for: FERRITIN  Lab Results   Component Value Date    TSH 3.76 08/13/2015     Lab Results   Component Value Date    HGBA1C 5.3 08/13/2015       PSG from 2008 was not available for review but we found some notes from [facility] mentioning his history of DOMINGUEZ.  sleep study done on 2/19/08 at the [facility] [place] for possible sleep apnea. Sleep latency 5 minutes without " Ambien. REM achieved. REM latency 67 minutes. Sleep efficiency 96%. Sleep architecture: Stage 1, 16% (5%), stage 2, 37% (45-55%), stage 3, 32% (15-20%), stage REM, 16% (20-25%). AHI was 73, with desaturations down to 74 %. REM , consistent with severe DOMINGUEZ. Periodic Limb Movement Index 0/hour.        Assessment and Plan:  In summary Sigifredo Shepard is a 47 y.o. year old male here for consulation.    1. DOMINGUEZ  Patient is not using any form of therapy.  We will order a new CAP device in auto mode.  Patient has a large polyp and this should be addressed since this can impact his ability to tolerate CPAP.  We will request a signed copy of his PSG.  F/U in 10-12 weeks for efficacy monitoring.      Patient verbalized understanding of these issues, agrees with the plan and all questions were answered today. Patient was given an opportuntity to voice any other symptoms or concerns not listed above. Patient did not have any other symptoms or concerns.      MD JERMAIN Miller Board Certified in Internal Medicine and Sleep Medicine  [facility].    We spent a total of 45 minutes of face-to-face encounter and more than 50% of the encounter was used for counseling or coordination of care.

## 2021-06-18 NOTE — PROGRESS NOTES
Order for Durable Medical Equipment was processed and equipment ordered.     DME provider: Shasha    Date Faxed: 6/21/18    Ordering Provider: Dr. Gibson    Equipment ordered: ResMed CPAP/RUBÉN for sleep records

## 2021-06-19 NOTE — PROGRESS NOTES
Patient was offered choice of vendor and chose [facility].  Patient Sigifredo Shepard was set up at [facility] on 7/31/18. Patient received a Resmed [id number] Auto. Pressures were set at 5-9.   Patient s ramp is 5 cm H2O for off and FLEX/EPR is EPR.  Patient received a Resmed Mask name: Airfit F20  FFM Size med, heated tubing and heated humidifier.    Pacee Her

## 2021-06-19 NOTE — PROGRESS NOTES
Preoperative Exam    Scheduled Procedure: Nasal Polyp Removal   Surgery Date:  7/18/2018  Surgery Location: [facility], fax [phone]    Surgeon:  Dr. Aviles     Assessment/Plan:     1. Preoperative examination  Labs pending.  No absolute contraindication to surgery  - Electrocardiogram Perform and Read  - HM1(CBC and Differential)  - Basic Metabolic Panel  - INR  - HM1 (CBC with Diff)    2. Nasal polyp  Follow up with ENT    3. S/P CABG x 2  Well controlled and will continue to monitor    4. Stented coronary artery  Follow up with cardiology    5. Obstructive sleep apnea          Surgical Procedure Risk: Labs pending at this time.  Results will be reviewed when available.  Have you had prior anesthesia?: Yes  Have you or any family members had a previous anesthesia reaction:  No  Do you or any family members have a history of a clotting or bleeding disorder?: No  Cardiac Risk Assessment: no increased risk for major cardiac complications    Patient approved for surgery with general or local anesthesia.    Please Note:    Functional Status: Independent  Patient plans to recover at home with family.     Subjective:      Sigifredo Shepard is a 47 y.o. male who presents for a preoperative consultation.      All other systems reviewed and are negative, other than those listed in the HPI.    Pertinent History  Do you have difficulty breathing or chest pain after walking up a flight of stairs: No  History of obstructive sleep apnea: Yes: seen by pulmonology and stable for the time being  Steroid use in the last 6 months: Yes: steroid for polyp reduction  Frequent Aspirin/NSAID use: Yes: Asa 81 mg   Prior Blood Transfusion: No  Prior Blood Transfusion Reaction: No  If for some reason prior to, during or after the procedure, if it is medically indicated, would you be willing to have a blood transfusion?:  There is no transfusion refusal.    Current Outpatient Prescriptions   Medication Sig Dispense Refill     aspirin 81  MG EC tablet Take 81 mg by mouth daily.       atorvastatin (LIPITOR) 80 MG tablet Take 1 tablet (80 mg total) by mouth at bedtime. 90 tablet 0     lisinopril-hydrochlorothiazide (PRINZIDE,ZESTORETIC) 20-25 mg per tablet Take 1 tablet by mouth every morning. 90 tablet 3     nitroglycerin (NITROSTAT) 0.4 MG SL tablet Place 0.4 mg under the tongue as needed.       fluticasone (FLONASE) 50 mcg/actuation nasal spray 2 sprays in each nostril twice daily. 16 g 0     No current facility-administered medications for this visit.         Allergies   Allergen Reactions     Diltiazem      Slows heart rate (bradycardia)        Patient Active Problem List   Diagnosis     STEMI (ST elevation myocardial infarction) (H)     Hyperlipidemia with target LDL less than 70     Obesity     HTN (hypertension)     CAD (coronary artery disease)     Anginal equivalent (H)     Obstructive sleep apnea     S/P CABG x 2     Stented coronary artery       Past Medical History:   Diagnosis Date     CAD (coronary artery disease)      HTN (hypertension)      MI (myocardial infarction)      Nasal polyp      DOMINGUEZ (obstructive sleep apnea)      Sebaceous cyst 06/26/2018    lanced      Toe contusion        Past Surgical History:   Procedure Laterality Date     CARDIAC SURGERY       CORONARY STENT PLACEMENT  10/09/2014     [place] CABG, VEIN, SINGLE      Description: CABG (CABG);  Recorded: 11/12/2014;  Comments: 2009     [place] REMOVAL OF SPERM DUCT(S)      Description: Surgery Of Male Genitalia Vasectomy;  Recorded: 11/12/2014;  Comments: 2009       Social History     Social History     Marital status:      Spouse name: N/A     Number of children: N/A     Years of education: N/A     Occupational History     Not on file.     Social History Main Topics     Smoking status: Former Smoker     Smokeless tobacco: Current User      Comment: Quit 10/11/14 and uses E-cigarette daily - small tank per day     Alcohol use Yes      Comment: rare     Drug use: No     Sexual  "activity: Not on file     Other Topics Concern     Not on file     Social History Narrative       Patient Care Team:  Carina Carter MD as PCP - General (Family Medicine)          Objective:     Vitals:    07/17/18 1602   BP: 132/82   Pulse: (!) 110   SpO2: 95%   Weight: (!) 311 lb (141.1 kg)   Height: 5' 9\" (1.753 m)         Physical Exam:    Physical Exam:  General Appearance: Alert, cooperative, no distress, appears stated age   Head: Normocephalic, without obvious abnormality, atraumatic  Eyes: PERRL, conjunctiva/corneas clear, EOM's intact   Ears: Normal TM's and external ear canals, both ears  Nose:Nares normal, septum midline,mucosa normal, no drainage    Throat:Lips, mucosa, and tongue normal; teeth and gums normal  Neck: Supple, symmetrical, trachea midline, no adenopathy;  thyroid: not enlarged, symmetric, no tenderness/mass/nodules  Back: Symmetric, no curvature, ROM normal,  Lungs: Clear to auscultation bilaterally, respirations unlabored  Heart: Regular rate and rhythm, S1 and S2 normal, no murmur, rub, or gallop  Abdomen: Soft, non-tender, bowel sounds active all four quadrants,  no masses, no organomegaly  Extremities: Extremities normal, atraumatic, no cyanosis or edema  Skin: Skin color, texture, turgor normal, no rashes or lesions  Lymph nodes: Cervical, supraclavicular, and axillary nodes normal  Neurologic: Normal          There are no Patient Instructions on file for this visit.    EKG:      Labs:  Labs pending at this time.  Results will be reviewed when available.    Immunization History   Administered Date(s) Administered     Tdap 08/13/2015           Electronically signed by Carina Carter MD 07/17/18 4:08 PM    "

## 2021-06-19 NOTE — PROGRESS NOTES
HISTORY OF PRESENT ILLNESS  Comes in for recheck after ESS with polypectomy. No complaints.    REVIEW OF SYSTEMS  Review of Systems: a 10-system review was performed. Pertinent positives are noted in the HPI and on a separate scanned document in the chart.    PMH, PSH, FH and SH has documented in the EHR.      EXAM    CONSTITUTIONAL  General Appearance:  Normal, well developed, well nourished, no obvious distress  Ability to Communicate:  communicates appropriately.    HEAD AND FACE  Appearance and Symmetry:  Normal, no scalp or facial scarring or suspicious lesions.  Paranasal sinuses tenderness:  Normal, Paranasal sinuses non tender    NOSE (speculum or scope)  Architecture:  Normal, Grossly normal external nasal architecture with no masses or lesions.  Nasapore pack removed from the ethmoid cavities. Cavities and nose is clean  Septum:  Normal, Septum non-obstructing.  Turbinates:  Normal, No turbinate abnormalities    IMPRESSION  Doing as expected.     RECOMMENDATION  Restart his fluticasone. Follow up as needed.    Mariusz Aviles MD

## 2021-06-19 NOTE — ANESTHESIA PREPROCEDURE EVALUATION
Anesthesia Evaluation      Patient summary reviewed   No history of anesthetic complications     Airway   Mallampati: I  Neck ROM: full  Comment: Obese   Pulmonary - normal exam    breath sounds clear to auscultation  (+) sleep apnea (Does not currently have a CPAP machine),   (-) shortness of breath, not a smoker (Former)                         Cardiovascular - normal exam  Exercise tolerance: > or = 4 METS  (+) hypertension, past MI (STEMI), CAD, CABG/stent (CABG x 2, stent), , hypercholesterolemia,     (-) angina, murmur  ECG reviewed  Rhythm: regular  Rate: normal,    no murmur      Neuro/Psych - negative ROS     Endo/Other    (+) obesity (BMI 46),      GI/Hepatic/Renal - negative ROS      Other findings: 4/2018 Stress trest   Myocardial Perfusion Conclusions        Myocardial perfusion imaging is normal.  There is no scan         evidence of ischemia or infarction.         Normal LV size and wall motion.         Left ventricular ejection fraction is normal, see above.         Compared to previous study dated 6-JUL-2013, no significant         change.      Dental    (+) poor dentition and chipped                       Anesthesia Plan  Planned anesthetic: general endotracheal  Glidescope  Phenylephrine inline for induction    Decadron 10 mg IV    Discussed possibility of overnoc observation based on his untreated DOMINGUEZ and cardiac hx.  Will monitor closely in recovery and consult with surgeon.  ASA 3   Induction: intravenous   Anesthetic plan and risks discussed with: patient  Anesthesia plan special considerations: video-assisted, antiemetics,   Post-op plan: routine recovery

## 2021-06-19 NOTE — ANESTHESIA CARE TRANSFER NOTE
Last vitals:   Vitals:    07/18/18 1137   BP: 162/78   Pulse: 78   Resp: 12   Temp: 37.2  C (98.9  F)   SpO2: 97%     Patient's level of consciousness is drowsy  Spontaneous respirations: yes  Maintains airway independently: yes  Dentition unchanged: yes  Oropharynx: oropharynx clear of all foreign objects    QCDR Measures:  ASA# 20 - Surgical Safety Checklist: WHO surgical safety checklist completed prior to induction  PQRS# 430 - Adult PONV Prevention: 4558F - Pt received => 2 anti-emetic agents (different classes) preop & intraop  ASA# 8 - Peds PONV Prevention: NA - Not pediatric patient, not GA or 2 or more risk factors NOT present  PQRS# 424 - Mary-op Temp Management: 4559F - At least one body temp DOCUMENTED => 35.5C or 95.9F within required timeframe  PQRS# 426 - PACU Transfer Protocol: - Transfer of care checklist used  ASA# 14 - Acute Post-op Pain: ASA14B - Patient did NOT experience pain >= 7 out of 10

## 2021-06-19 NOTE — PROGRESS NOTES
Family Medicine Office Visit  [facility]  Patient Name: Sigifredo Shepard  Patient Age: 47 y.o.  YOB: 1970  MRN: [id number]    Date of Visit: 2018  Reason for Office Visit:   Chief Complaint   Patient presents with     Hospital Visit Follow Up     Markus VALLE, , Sinus Polyp removal          Assessment / Plan / Medical Decision Makin. Nasal Polyposis  2. DOMINGUEZ  Sigifredo Shepard is a 47 y.o man who presents to the clinic for a follow up post nasal polyp surgery. Neils surgery appears to have gone well despite a overnight watch the following day after the surgery for HR and O2 stat concerns. He says he has been sleeping better due to the surgery and pain medications but is not sure if his snoring has improved yet. His nasal canals are much less obstructed than prior to the surgery and only mild erythema is noted in the right canal. He is still experiencing mild nasal bleeds and drip that look pink in color. He is following up with ENT in a week for this issue and will continue to monitor it in the meantime. He is not fatigued or tired and is getting a new CPAP machine that was ordered for him by Sleep medicine a month ago. He also had a previous diagnoses of depression in his chart from  but has not been on medication for over 8 years and says he hasnt felt like that since , a PHQ2 was reviewed and the problem was marked as resolved on his chart.      1. Nasal polyp-follow up  Monitor bleeding and follow up with ENT 18    2. Obstructive sleep apnea - stable  Follow up as per sleep medicine with CPAP machine order / [facility]    3. Depression  PHQ-2 reviewed  Resolved in chart    Health Maintenance Review  Health Maintenance   Topic Date Due     DEPRESSION FOLLOW UP  1970     ADVANCE DIRECTIVES DISCUSSED WITH PATIENT  1990     INFLUENZA VACCINE RULE BASED (1) 2018     TD 18+ HE  2025     TDAP ADULT ONE TIME DOSE  Completed          I have discontinued Mr. Shepard's predniSONE. I am also having him maintain his fluticasone, atorvastatin, nitroglycerin, lisinopril-hydrochlorothiazide, cefuroxime, HYDROcodone-acetaminophen, acetaminophen, and benzocaine.      HPI:  Sigifredo Shepard is a 47 y.o. year old male with a significant past medical history of MI, CAD, obesity, sleep apnea, and nasal polyps who presents to the clinic today for a post-surgical follow up for Nasal polyps removal five days ago. The patient states that he had to be watched overnight after the surgery due to issues with his heart rate and O2 stats, but was discharged the next day and has felt fine. He denies any fatigue, palpations or shortness of breath and says that he feels like he has been sleeping better since the surgery. He attributes some of his sleep to the pain medications he takes before bed and is not sure if his snoring has improved yet. Two days after the surgery he was having black stools but has since had them return to normal color. He still has some increased pressure he can notice along where his maxillary sinuses border his nose and has some post-nasal drip that occasionally bleeds and looks pink in color that will come out his nose if he leans forward but usually drains through his pharynx. The packing gauze placed after the surgery came loose on Saturday and is no longer in place. His pain is well controlled with the pain medication he was given after the surgery despite mild nausea from them. He has no worries or complaints to be addressed today and is going to call [facility] to get the new CPAP machine that was ordered for him a month ago per Sleep Medicine.       Review of Systems- pertinent positive in bold:  Constitutional: No Fever, chills, night sweats, fainting, weight change, fatigue, seizures, dizziness, sleeping difficulties-improving, loud snoring/pauses in breathing  Eyes: No change in vision, blurred or double vision, redness/eye  pain  Ears, nose, mouth, throat: No difficulty swallowing or sores in the mouth or throat, pressure in sinuses, pink nasal drip  Respiratory: No shortness of breath, cough, bloody sputum, wheezing  Cardiovascular: No chest pain, palpitations   Gastrointestinal: No abdominal pain, heartburn/indigestion, nausea/vomiting, change in appetite, change in bowel habits, constipation or diarrhea, dark stools  Urinary: No painful urination, frequent urination, urinary urgency/incontinence, blood in urine/dark urine, nocturia  Musculoskeletal: No backache/back pain (new or increasing), weakness, joint pain/stiffness (new or increasing), muscle cramps, swelling of hands, feet, ankles, leg pain/redness  Skin: No change in moles/freckles, rash, nodules  Hematologic/lymphatic: No swollen lymph glands, abnormal bruising/bleeding  Endocrine: No excessive thirst/urination, cold or heat intolerance  Neurologic/emotional: No worrisome memory change, numbness/tingling, anxiety, mood swings      Current Scheduled Meds:  Outpatient Encounter Prescriptions as of 7/23/2018   Medication Sig Dispense Refill     acetaminophen (TYLENOL) 325 MG tablet Take 325-650 mg by mouth every 6 (six) hours as needed for pain.       atorvastatin (LIPITOR) 80 MG tablet Take 1 tablet (80 mg total) by mouth at bedtime. 90 tablet 0     benzocaine (ORAJEL) 10 % mucosal gel Apply 1 application to the mouth or throat as needed for pain (tooth pain).        cefuroxime (CEFTIN) 250 MG tablet Take 1 tablet (250 mg total) by mouth 2 (two) times a day for 10 days. 20 tablet 0     fluticasone (FLONASE) 50 mcg/actuation nasal spray 2 sprays in each nostril twice daily. 16 g 0     HYDROcodone-acetaminophen 5-325 mg per tablet Take 1-2 tablets by mouth every 4 (four) hours as needed for pain. 40 tablet 0     lisinopril-hydrochlorothiazide (PRINZIDE,ZESTORETIC) 20-25 mg per tablet Take 1 tablet by mouth every morning. 90 tablet 3     nitroglycerin (NITROSTAT) 0.4 MG SL  "tablet Place 0.4 mg under the tongue every 5 (five) minutes as needed for chest pain. If no relief after 5 min call 911; Continue 1 tab every 5 min. Max 3 tabs.       [DISCONTINUED] predniSONE (DELTASONE) 20 MG tablet Take 60 mg by mouth daily. For 7 days       No facility-administered encounter medications on file as of 7/23/2018.      Past Medical History:   Diagnosis Date     CAD (coronary artery disease)      HTN (hypertension)      MI (myocardial infarction)      Nasal polyp      DOMINGUEZ (obstructive sleep apnea)      Sebaceous cyst 06/26/2018    lanced      Toe contusion      Past Surgical History:   Procedure Laterality Date     CARDIAC SURGERY       CORONARY STENT PLACEMENT  10/09/2014     OH CABG, VEIN, SINGLE      Description: CABG (CABG);  Recorded: 11/12/2014;  Comments: 2009     OH REMOVAL OF SPERM DUCT(S)      Description: Surgery Of Male Genitalia Vasectomy;  Recorded: 11/12/2014;  Comments: 2009     SINUS SURGERY Bilateral 7/18/2018    Procedure: BILATERAL ENDOSCOPIC SINUS SURGERY;  Surgeon: Mariusz Aviles MD;  Location: [facility];  Service:      Social History   Substance Use Topics     Smoking status: Former Smoker     Smokeless tobacco: Current User      Comment: Quit 10/11/14 and uses E-cigarette daily - small tank per day     Alcohol use Yes      Comment: rare       Objective / Physical Examination:  Vitals:    07/23/18 0811   BP: 138/78   Pulse: 74   SpO2: 96%   Weight: (!) 318 lb (144.2 kg)   Height: 5' 9\" (1.753 m)     Wt Readings from Last 3 Encounters:   07/23/18 (!) 318 lb (144.2 kg)   07/19/18 (!) 316 lb 4.8 oz (143.5 kg)   07/17/18 (!) 311 lb (141.1 kg)     BP Readings from Last 3 Encounters:   07/23/18 138/78   07/19/18 123/63   07/17/18 132/82       Body mass index is 46.96 kg/(m^2).     General Appearance: Alert and oriented, cooperative, affect appropriate, speech clear, in no apparent distress  Head: Normocephalic, atraumatic  Ears: Tympanic membrane clear with landmarks well " visualized bilaterally  Eyes: PERRL, fundi appear clear bilaterally. EOMI. Conjunctivae clear and sclerae non-icteric  Nose: Septum midline, nares patent, no visible polyps, mucosa moist and with drainage, mild erythema in right upper nasal canal  Throat: Lips and mucosa moist. Teeth in good repair, pharynx without erythema or exudate  Neck: Supple, trachea midline. No cervical adenopathy  Back: Symmetrical and nontender  Lungs: Clear to auscultation bilaterally. Normal inspiratory and expiratory effort  Cardiovascular: Regular rate, normal S1, S2. No murmurs, rubs, or gallops  Abdomen: Bowel sounds active all four quadrants. Soft, non-tender. No hepatomegaly or splenomegaly. No bruits detected.   Extremities: Pulses 2+ and equal throughout. No edema. Strength equal throughout.  Integumentary: Warm and dry. Without suspicious looking lesions  Neuro: Alert and oriented, follows commands appropriately.       No orders of the defined types were placed in this encounter.  Followup: No Follow-up on file. earlier if needed.        Morris Rosas, Medical Student  I, Dr. Carina aguirre , personally performed the services described in this documentation, as written by and performed by Morris Rosas in my presence, and it is both accurate and complete.

## 2021-06-19 NOTE — PROGRESS NOTES
HISTORY OF PRESENT ILLNESS  Ask to be seen by Dr. Carter for evaluation of nasal polyps. Patient was seen by me over 3 years ago for evaluation of nasal polyps. At that time we discussed sinus surgery. However he declined because he didn't feel his symptoms were bad enough. Now the patient reports that his CPAP isn't working because his nose is plugged with polyps.  He denies green or yellow nasal discharge. No pain. His primary complaint is not being able to breathe through his nose.    REVIEW OF SYSTEMS  Review of Systems: a 10-system review was performed. Pertinent positives are noted in the HPI and on a separate scanned document in the chart.    PMH, PSH, FH and SH has documented in the EHR.      EXAM    CONSTITUTIONAL  General Appearance:  Normal, well developed, well nourished, no obvious distress  Ability to Communicate:  communicates appropriately.    HEAD AND FACE  Appearance and Symmetry:  Normal, no scalp or facial scarring or suspicious lesions.  Paranasal sinuses tenderness:  Normal, Paranasal sinuses non tender    EARS  Clinical speech reception threshold:  Normal, able to hear normal speech.  Auricle:  Normal, Auricles without scars, lesions, masses.  External auditory canal:  Normal, External auditory canal normal.  Tympanic membrane:  Normal, Tympanic membranes normal without swelling or erythema.  Tympanic membrane mobility:  Normal, Normal tympanic membrane mobility.    NOSE (speculum or scope)  Architecture:  Normal, Grossly normal external nasal architecture with no masses or lesions.  Mucosa:  Normal mucosa, No polyps or masses.  Septum:  Normal, Septum non-obstructing.  Turbinates:  Normal, No turbinate abnormalities    ORAL CAVITY AND OROPHARYNX  Lips:  Normal.  Dental and gingiva:  Normal, No obvious dental or gingival disease.  Mucosa:  Normal, Moist mucous membranes.  Tongue:  Normal, Tongue mobile with no mucosal abnormalities  Hard and soft palate:  Normal, Hard and soft palate without  cleft or mucosal lesions.  Oral pharynx:  Normal, Posterior pharynx without lesions or remarkable asymmetry.  Saliva:  Normal, Clear saliva.  Masses:  Normal, No palpable masses or pathologically enlarged lymph nodes.    NECK  Masses/lymph nodes:  Normal, No worrisome neck masses or lymph nodes.  Salivary glands:  Normal, Parotid and submandibular glands.  Trachea and larynx position:  Normal, Trachea and larynx midline.  Thyroid:  Normal, No thyroid abnormality.  Tenderness:  Normal, No cervical tenderness.  Suppleness:  Normal, Neck supple    NEUROLOGICAL  Speech pattern:  Normal, Proasaic    RESPIRATORY  Symmetry and Respiratory effort:  Normal, Symmetric chest movement and expansion with no increased intercostal retractions or use of accessory muscles.     IMPRESSION  Bilateral nasal polyposis right much greater than left.    RECOMMENDATION   Endoscopic sinus surgery. I discussed the procedure goals and risks. I answered his questions. He would like to proceed. We will set this up in the near future.    Mariusz Aviles MD

## 2021-06-19 NOTE — ANESTHESIA POSTPROCEDURE EVALUATION
Patient: Sigifredo Shepard  BILATERAL ENDOSCOPIC SINUS SURGERY  Anesthesia type: general    Patient location: Phase II Recovery  Last vitals:   Vitals:    07/18/18 1408   BP: 164/77   Pulse: 66   Resp: 16   Temp:    SpO2: 97%     Post vital signs: stable but desaturates during sleep sitting up in chair.  Level of consciousness: awake, alert and oriented  Post-anesthesia pain: pain controlled  Post-anesthesia nausea and vomiting: no  Pulmonary: unassisted, return to baseline  Cardiovascular: stable and blood pressure at baseline  Hydration: adequate  Anesthetic events: no    QCDR Measures:  ASA# 11 - Mary-op Cardiac Arrest: ASA11B - Patient did NOT experience unanticipated cardiac arrest  ASA# 12 - Mary-op Mortality Rate: ASA12B - Patient did NOT die  ASA# 13 - PACU Re-Intubation Rate: ASA13B - Patient did NOT require a new airway mgmt  ASA# 10 - Composite Anes Safety: ASA10A - No serious adverse event    Additional Notes:  Pt has not been using his CPAP because it is broken.  Desaturation during sleep.  Informed Dr. DENISSE Aviles and plan is to admit for observation and monitor for DOMINGUEZ with continuous SpO2.

## 2021-06-20 NOTE — PROGRESS NOTES
Dear Dr. Carina Carter MD  [address] [place] [address],    Thank you for the opportunity to participate in the care of Sigifredo Shepard.     He is a 47 y.o. y/o male patient who comes to the sleep medicine clinic for his initial CPAP follow up.    He was diagnosed with severe OSAH (AHI=73). We ordered a new CPAP device after his initial visit 3 months ago.  Initial PAP settings: P= 7 cwp  Current PAP settings: APAP 5-9 cwp    He is using this new device more frequently than his previous device but he admits that his usage is not as good as it should be. He is not snoring when he uses his device.  He is a full face mask.     30-Days Efficacy and Compliance Data  Residual AHI: 11.8 (hypopnea index=9.4)  Leak: 15.7  Days used > 4 hours: 7/30  Average usage per night: 4 hours 38 minutes (days used)  95th percentile P: 9.0 (median P= 9.0)    Mask Tolerance: excellent  Skin irritation: no    During his previous visit, we recommended ENT evaluation due to polyps and he completed this visit in July. He is able to breathe without difficulties since his surgery.    Past Medical History:   Diagnosis Date     CAD (coronary artery disease)      HTN (hypertension)      MI (myocardial infarction)      Nasal polyp      DOMINUGEZ (obstructive sleep apnea)      Sebaceous cyst 06/26/2018    lanced      Toe contusion        Past Surgical History:   Procedure Laterality Date     CARDIAC SURGERY       CORONARY STENT PLACEMENT  10/09/2014     OH CABG, VEIN, SINGLE      Description: CABG (CABG);  Recorded: 11/12/2014;  Comments: 2009     OH REMOVAL OF SPERM DUCT(S)      Description: Surgery Of Male Genitalia Vasectomy;  Recorded: 11/12/2014;  Comments: 2009     SINUS SURGERY Bilateral 7/18/2018    Procedure: BILATERAL ENDOSCOPIC SINUS SURGERY;  Surgeon: Mariusz Aviles MD;  Location: [facility] OR;  Service:        Social History     Social History     Marital status:      Spouse name: N/A     Number of children: N/A      Years of education: N/A     Occupational History     Not on file.     Social History Main Topics     Smoking status: Former Smoker     Smokeless tobacco: Current User      Comment: Quit 10/11/14 and uses E-cigarette daily - small tank per day     Alcohol use Yes      Comment: rare     Drug use: No     Sexual activity: Not on file     Other Topics Concern     Not on file     Social History Narrative       Review of Systems:  General: No weight gain, no weight loss  Eyes: No vision changes  ENT: No hearing changes  Cardio: No chest pain, no nocturnal dyspnea  Respiratory: No shortness of breath, no cough  Gastrointestinal: No diarrhea, no constipation  Genitourinary: No excessive urination  Tegumentary: No rashes  Neurological: No seizures, no loss of consciousness  Endo: No heat or cold intolerance.    Current Outpatient Prescriptions   Medication Sig Dispense Refill     acetaminophen (TYLENOL) 325 MG tablet Take 325-650 mg by mouth every 6 (six) hours as needed for pain.       aspirin 81 MG EC tablet Take 81 mg by mouth daily.       atorvastatin (LIPITOR) 80 MG tablet Take 1 tablet (80 mg total) by mouth at bedtime. 90 tablet 0     benzocaine (ORAJEL) 10 % mucosal gel Apply 1 application to the mouth or throat as needed for pain (tooth pain).        fluticasone (FLONASE) 50 mcg/actuation nasal spray 2 sprays in each nostril twice daily. 16 g 0     lisinopril-hydrochlorothiazide (PRINZIDE,ZESTORETIC) 20-25 mg per tablet Take 1 tablet by mouth every morning. 90 tablet 3     nitroglycerin (NITROSTAT) 0.4 MG SL tablet Place 0.4 mg under the tongue every 5 (five) minutes as needed for chest pain. If no relief after 5 min call 911; Continue 1 tab every 5 min. Max 3 tabs.       No current facility-administered medications for this visit.        Allergies   Allergen Reactions     Diltiazem      Slows heart rate (bradycardia)      Vicodin [Hydrocodone-Acetaminophen] Other (See Comments)     Slowed HR       Physical Exam:  BP  "136/70  Ht 5' 10\" (1.778 m)  Wt (!) 315 lb (142.9 kg)  BMI 45.2 kg/m2  BMI:Body mass index is 45.2 kg/(m^2).   GEN: NAD, obese  Neurological: Alert, oriented to time, place, and person.  Psych: normal mood, normal affect     Labs/Studies:     I reviewed the efficacy and compliance report from his device. Data summarized on the HPI.     Lab Results   Component Value Date    WBC 9.2 09/18/2018    HGB 14.7 09/18/2018    HCT 42.7 09/18/2018    MCV 93 09/18/2018     09/18/2018         Chemistry        Component Value Date/Time     09/18/2018 1428    K 4.3 09/18/2018 1428     09/18/2018 1428    CO2 25 09/18/2018 1428    BUN 13 09/18/2018 1428    CREATININE 0.73 09/18/2018 1428     09/18/2018 1428        Component Value Date/Time    CALCIUM 9.6 09/18/2018 1428    ALKPHOS 63 09/18/2018 1428    AST 24 09/18/2018 1428    ALT 37 09/18/2018 1428    BILITOT 0.6 09/18/2018 1428            No results found for: FERRITIN        Assessment and Plan:  In summary Sigifredo Shepard is a 47 y.o. year old male who is here for follow up.    1. Obstructive Sleep Apnea.  Residual AHI is improved compared to his baseline study but it needs improvement.  Compliance is low  Patient is benefiting from using PAP therapy but should increase usage.  We will change the pressure settings to: APAP 9-12 cwp    We counseled the patient on the importannce of using his PAP device every night and the risks of not treating sleep apnea.      Patient verbalized understanding of these issues, agrees with the plan and all questions were answered today. Patient was given an opportuntity to voice any other symptoms or concerns not listed above. Patient did not have any other symptoms or concerns.      Patient told to return in 4 months.     I explained to the patient that I will be transitioning to a different job soon. I reassured him that this transition should not cause any significant disruptions to his care. I provided some " information on the process and encouraged him to contact our main number if he has any questions or needs any help.    MD JERMAIN Miller Board Certified in Internal Medicine and Sleep Medicine  [facility].

## 2021-06-20 NOTE — PROGRESS NOTES
"Family Medicine Office Visit  [facility]  Patient Name: Sigifredo Shepard  Patient Age: 47 y.o.  YOB: 1970  MRN: [id number]    Date of Visit: 2018  Reason for Office Visit:   Chief Complaint   Patient presents with     Follow-up     [facility] . Palpatations. Currently still having them. No chest pain. Sometimes has SOB.            Assessment / Plan / Medical Decision Makin. Palpitations  Will do Holter and labs and call witht he results.  Recommend follow up with cardiology as soon as possible. If any worsening symptoms, go to the ER  - Holter Monitor; Future  - Thyroid Cascade  - Comprehensive Metabolic Panel  - HM1(CBC and Differential)  - Lipid Cascade RANDOM  - HM1 (CBC with Diff)    2. S/P CABG x 2  Follow up with cardiology    3. Morbid obesity (H)  Discussed diet and exercise    4. Essential hypertension  Continue on current medications    5. Obstructive sleep apnea  Continue with CPAP and follow up with sleep medicine as directed.        Health Maintenance Review  Health Maintenance   Topic Date Due     DEPRESSION FOLLOW UP  1972     ADVANCE DIRECTIVES DISCUSSED WITH PATIENT  1990     INFLUENZA VACCINE RULE BASED (1) 2018     TD 18+ HE  2025     TDAP ADULT ONE TIME DOSE  Completed         I have discontinued Mr. Shepard's HYDROcodone-acetaminophen. I am also having him maintain his fluticasone, atorvastatin, nitroglycerin, lisinopril-hydrochlorothiazide, acetaminophen, benzocaine, and aspirin.      HPI:  Sigifredo Shepard is a 47 y.o. year old who presents to the office today for palpitations.  Seen at [facility] for palpitations.  States they started like this when ended up with STEMI.  Feels like a \"butterfly\".  Not associated with anything - will happen if sitting at work and then other times with movement.  No cp.  No shortness of breath.  Sleeping much improved since getting the CPAP.  Occasionally the palpitations will take his breath " away.  Just located in the middle in the sternal region, no radiation.  Will try coughing and occasionally that will stop them for a little while.  Once had the palpitations all day        Review of Systems- pertinent positive in bold:  Constitutional: Fever, chills, night sweats, fainting, weight change, fatigue, seizures, dizziness, sleeping difficulties, loud snoring/pauses in breathing  Eyes: change in vision, blurred or double vision, redness/eye pain  Ears, nose, mouth, throat: change in hearing, ear pain, hoarseness, difficulty swallowing, sores in the mouth or throat  Respiratory: shortness of breath, cough, bloody sputum, wheezing  Cardiovascular: chest pain, palpitations   Gastrointestinal: abdominal pain, heartburn/indigestion, nausea/vomiting, change in appetite, change in bowel habits, constipation or diarrhea, rectal bleeding/dark stools, difficulty swallowing  Urinary: painful urination, frequent urination, urinary urgency/incontinence, blood in urine/dark urine, nocturia  Musculoskeletal: backache/back pain (new or increasing), weakness, joint pain/stiffness (new or increasing), muscle cramps, swelling of hands, feet, ankles, leg pain/redness  Skin: change in moles/freckles, rash, nodules  Hematologic/lymphatic: swollen lymph glands, abnormal bruising/bleeding  Endocrine: excessive thirst/urination, cold or heat intolerance  Neurologic/emotional: worrisome memory change, numbness/tingling, anxiety, mood swings      Current Scheduled Meds:  Outpatient Encounter Prescriptions as of 9/18/2018   Medication Sig Dispense Refill     acetaminophen (TYLENOL) 325 MG tablet Take 325-650 mg by mouth every 6 (six) hours as needed for pain.       aspirin 81 MG EC tablet Take 81 mg by mouth daily.       atorvastatin (LIPITOR) 80 MG tablet Take 1 tablet (80 mg total) by mouth at bedtime. 90 tablet 0     benzocaine (ORAJEL) 10 % mucosal gel Apply 1 application to the mouth or throat as needed for pain (tooth pain).    "     fluticasone (FLONASE) 50 mcg/actuation nasal spray 2 sprays in each nostril twice daily. 16 g 0     lisinopril-hydrochlorothiazide (PRINZIDE,ZESTORETIC) 20-25 mg per tablet Take 1 tablet by mouth every morning. 90 tablet 3     nitroglycerin (NITROSTAT) 0.4 MG SL tablet Place 0.4 mg under the tongue every 5 (five) minutes as needed for chest pain. If no relief after 5 min call 911; Continue 1 tab every 5 min. Max 3 tabs.       [DISCONTINUED] HYDROcodone-acetaminophen 5-325 mg per tablet Take 1-2 tablets by mouth every 4 (four) hours as needed for pain. 40 tablet 0     No facility-administered encounter medications on file as of 9/18/2018.      Past Medical History:   Diagnosis Date     CAD (coronary artery disease)      HTN (hypertension)      MI (myocardial infarction)      Nasal polyp      DOMINGUEZ (obstructive sleep apnea)      Sebaceous cyst 06/26/2018    lanced      Toe contusion      Past Surgical History:   Procedure Laterality Date     CARDIAC SURGERY       CORONARY STENT PLACEMENT  10/09/2014     ME CABG, VEIN, SINGLE      Description: CABG (CABG);  Recorded: 11/12/2014;  Comments: 2009     ME REMOVAL OF SPERM DUCT(S)      Description: Surgery Of Male Genitalia Vasectomy;  Recorded: 11/12/2014;  Comments: 2009     SINUS SURGERY Bilateral 7/18/2018    Procedure: BILATERAL ENDOSCOPIC SINUS SURGERY;  Surgeon: Mariusz Aviles MD;  Location: [facility];  Service:      Social History   Substance Use Topics     Smoking status: Former Smoker     Smokeless tobacco: Current User      Comment: Quit 10/11/14 and uses E-cigarette daily - small tank per day     Alcohol use Yes      Comment: rare       Objective / Physical Examination:  Vitals:    09/18/18 1403 09/18/18 1418   BP: 172/86 158/72   Pulse: 74    Weight: (!) 317 lb (143.8 kg)    Height: 5' 10\" (1.778 m)      Wt Readings from Last 3 Encounters:   09/18/18 (!) 317 lb (143.8 kg)   09/13/18 (!) 317 lb (143.8 kg)   07/23/18 (!) 318 lb (144.2 kg)     BP " Readings from Last 3 Encounters:   09/18/18 158/72   09/13/18 143/73   07/23/18 138/78     Body mass index is 45.48 kg/(m^2).     General Appearance: Alert and oriented, cooperative, affect appropriate, speech clear, in no apparent distress  Head: Normocephalic, atraumatic  Ears: Tympanic membrane clear with landmarks well visualized bilaterally  Eyes: PERRL, fundi appear clear bilaterally. EOMI. Conjunctivae clear and sclerae non-icteric  Nose: Septum midline, nares patent, no visible polyps, mucosa moist and without drainage  Throat: Lips and mucosa moist. Teeth in good repair, pharynx without erythema or exudate  Neck: Supple, trachea midline. No cervical adenopathy  Back: Symmetrical and nontender  Lungs: Clear to auscultation bilaterally. Normal inspiratory and expiratory effort  Cardiovascular: Regular rate, normal S1, S2. No murmurs, rubs, or gallops  Abdomen: Bowel sounds active all four quadrants. Soft, non-tender. No hepatomegaly or splenomegaly. No bruits detected.   Extremities: Pulses 2+ and equal throughout. No edema. Strength equal throughout.  Integumentary: Warm and dry. Without suspicious looking lesions  Neuro: Alert and oriented, follows commands appropriately.     Orders Placed This Encounter   Procedures     Thyroid [place]     Comprehensive Metabolic Panel     Lipid Cascade RANDOM     HM1 (CBC with Diff)   Followup: No Follow-up on file. earlier if needed.    Total time spent with patient was 30 min with >50% of time spent in face-to-face counseling regarding the above plan       Carina Carter MD

## 2021-06-20 NOTE — PROGRESS NOTES
Result is/are normal for kidney function, thyroid normal, no anemia.  Cholesterol still elevated and stay on the atorvastatin.

## 2021-06-20 NOTE — PROGRESS NOTES
Assessment/Plan:   Lateral epicondylitis of right elbow  Progressive right lateral epicondylitis.  We will continue to try to decreased inflammation, and rest as able.  Will try an arm strap.  Close follow up with primary care and or physical therapy if needed. Watch for GERD, elevated blood pressure, CP, or leg swelling while taking naproxen.   - Wrist/Arm DME: Tennis Elbow Arm Band; Right  - naproxen (NAPROSYN) 500 MG tablet; Take 1 tablet (500 mg total) by mouth 2 (two) times a day with meals.  Dispense: 30 tablet; Refill: 0    Wear arm brace at work  Continue to ice after work and as needed  Naproxen 500mg twice a day with food regularly for about 2 weeks then as needed  Antacids or zantac as needed for acid reflux  Arm band supplied for epicondylitis  Continue to work with arm band, rest as able, limit aggravating activities as much as possible.   Recheck with primary in about 10 days for further management if needed.     Subjective:      Sigifredo Shepard is a 47 y.o. male who presents with pain at the right elbow and forearm.  He has had some pain at the lateral right elbow and into the forearm for a couple weeks. At first he only noticed it with lifting, which he does a lot of at work.  He recalls that he often smacks that side of the elbow while he is working but there was no one instance that triggered the problem. He works at [employer], assembly style. He has had increasing persistence and severity of the pain - occurring with less stress - such as lifting a coffee cup or eating, pulling up the blankets. He has no numbness or weakness per se in the hand. No redness or swelling of the elbow. He has been using ice and taking advil 800mg at times. He has visited with a work  about it and they have said if it persists he may need a strap but that was 2 weeks ago and they haven't been back to check on him. He has been having right shoulder impingement for awhile, that is a separate issue and  seems unrelated. No rash. No fever. He otherwise feels well. He has history of CAD but has normal renal function. No edema or chest pain or elevated blood pressures. Has had GERD in the past but not recently and tolerates ibuprofen.  NKDA    Current Outpatient Prescriptions on File Prior to Visit   Medication Sig Dispense Refill     acetaminophen (TYLENOL) 325 MG tablet Take 325-650 mg by mouth every 6 (six) hours as needed for pain.       aspirin 81 MG EC tablet Take 81 mg by mouth daily.       atorvastatin (LIPITOR) 80 MG tablet Take 1 tablet (80 mg total) by mouth at bedtime. 90 tablet 0     benzocaine (ORAJEL) 10 % mucosal gel Apply 1 application to the mouth or throat as needed for pain (tooth pain).        fluticasone (FLONASE) 50 mcg/actuation nasal spray 2 sprays in each nostril twice daily. 16 g 0     lisinopril-hydrochlorothiazide (PRINZIDE,ZESTORETIC) 20-25 mg per tablet Take 1 tablet by mouth every morning. 90 tablet 3     nitroglycerin (NITROSTAT) 0.4 MG SL tablet Place 0.4 mg under the tongue every 5 (five) minutes as needed for chest pain. If no relief after 5 min call 911; Continue 1 tab every 5 min. Max 3 tabs.       No current facility-administered medications on file prior to visit.      Patient Active Problem List   Diagnosis     STEMI (ST elevation myocardial infarction) (H)     HLD (hyperlipidemia)     Morbid obesity (H)     Essential hypertension     CAD (coronary artery disease)     Anginal equivalent (H)     Obstructive sleep apnea     S/P CABG x 2     Stented coronary artery     Acute respiratory failure (H)     Nasal polyposis       Objective:     /74  Pulse 100  Temp 98  F (36.7  C) (Oral)   Resp 18  Wt (!) 312 lb (141.5 kg)  SpO2 98%  BMI 44.77 kg/m2    Physical  General Appearance: Alert, cooperative, no distress  Head: Normocephalic, without obvious abnormality, atraumatic   Eyes: Conjunctivae are normal.   Nose: No significant congestion .  Extremities: No lower extremity  edema.  He has tenderness over the lateral epicondyle and distally along the tendon into the forearm. No olecranon pain or redness or swelling. There is pain at the lateral epicondyle with gripping and extension and flexion of the hand, less so with supination and pronation. Strength is intact but painful. Normal wrist pulses and cap refill, no numbness. Mild shoulder impingement is not new.   Skin: Skin color, texture, turgor normal, no rashes or lesions   Psychiatric: Patient has a normal mood and affect.
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
## 2021-06-24 NOTE — PROGRESS NOTES
Called pt to offer clinic care coordination, LMTCB. If pt is returning my call please transfer him to Rafia at ext [phone]

## 2021-06-24 NOTE — PROGRESS NOTES
Mild to moderate arthritis seen on XR.  Can try the stretching exercises and if no improvement then recommend referral to orthopedics to see if an injection is appropriate.

## 2021-06-25 NOTE — PROGRESS NOTES
Attempt 3: Care Guide called patient to offer services.  If this patient is returning my call, please transfer to Rafia at ext [phone].  I have called this patient 3 times over the past two weeks and have been unsuccessful in reaching him.  This patient has also not returned any of my messages. I will discontinue outreach at this time.

## 2021-06-26 NOTE — PROGRESS NOTES
Progress Notes by Rambo Mars MD at 5/17/2018  4:10 PM     Author: Rambo Mars MD Service: -- Author Type: Physician    Filed: 5/17/2018  4:56 PM Encounter Date: 5/17/2018 Status: Signed    : Rambo Mars MD (Physician)           Click to link to [facility] HEART CARE NOTE    Thank you, Dr. Carina Carter MD, for asking the [facility] Care team to see Mr. Sigifredo Shepard to evaluate Consult for CAD and loose sternal wire.      Assessment/Recommendations   Assessment:    1. Coronary artery disease - stable without angina.  2. Hypertension - mildly elevated BP. I advised diet and exercise.  3. Hyperlipidemia - on appropriate statin  4. Loose sternotomy wire - causing pain and discomfort. Pt requesting surgical evaluation for removal.  5. Obesity, uncontrolled -I advised lifestyle changes with diet and exercise with a goal of weight loss.    Plan:  1. Continue medications as prescribed.   2. No specific cardiac testing is necessary at this time.  3. Referred to cardiothoracic surgery for consideration of sternal wire removal as per patient request.  I advised the patient to obtain the x-ray that demonstrates the loose sternal wire and bring the images and report to his appointment with the surgeon.  4. Follow up in 6 months or sooner if needed.           History of Present Illness   Mr. Sigifredo Shepard is a 47 y.o. male with a significant past history of Acute ST segment elevation MI status post coronary artery bypass graft and 2009 consisting of a LIMA to the LAD and a vein graft to the RCA.  He then had another acute ST segment elevation MI in 2014 that was treated with PCI with a bare metal stent to the circumflex coronary artery.  At that time his grafts were patent both to the LAD and the RCA.  It was noted that the proximal LAD and RCA coronary arteries were totally occluded. He was last seen by ALTON Li in the [facility] cardiology clinic in  September of 2015. He presents today to establish cardiovascular care.    Today, Mr. Shepard reports that he is feeling generally well. He has some symptoms of palpitations when taking metoprolol that do not occur when he does not take the medication. He also reports diarrhea from atorvastatin.     He doesn't get regular exercise but plans to start more now that the weather has improved.     Mr. Shepard also reports having a loose sternotomy wire that causes pain when breathing deeply and coughing. This was apparently identified on an x-ray of his shoulder. I am not able to locate that report (obtained at [facility]). He is requesting an appointment with a surgeon to have this removed.    Prior records from [facility] were reviewed in Care Everywhere.    Other than noted above, Mr. Shepard denies any chest pain/pressure/tightness, shortness of breath at rest or with exertion, light headedness/dizziness, pre-syncope, syncope, lower extremity swelling, palpitations, paroxysmal nocturnal dyspnea (PND), or orthopnea.     Cardiac Problems and Cardiac Diagnostics   Cardiac Problem List:  1. Coronary artery disease s/p CABG and PCI to Cx  2. H/O CABG 2009 with LIMA to LAD and SVG to RCA  3. Prior PCI with BMS to Cx  4. History of medication non-compliance.    Most Recent Cardiac testing:  None recent.    ECHO (report reviewed):   Echo results:  8/5/15 ([facility]):  CONCLUSION:    Normal LV and RV size and systolic function.     Mild LVH.    LVEF 60%.     Mild biatrial enlargement.      Stress test: exercise nuclear stress test 4/17/18 ([facility]):  Pt exercised 10 minutes 37 seconds on an accelerated modified Federico protocol  Myocardial Perfusion Conclusions        Myocardial perfusion imaging is normal.  There is no scan         evidence of ischemia or infarction.         Normal LV size and wall motion.         Left ventricular ejection fraction is normal, see above.         Compared to previous study  dated 6-JUL-2013, no significant         change.     Cardiac cath:   2014 ([facility]):  Conclusions        Successful PCI of occluded proximal circumflex (acute,         culprit vessel) with single bare metal stent extending         into large OM1.  Side branch was angioplastied without         stenting because of significant ostial stenosis post         stent.  No residual stenosis in stent, 50% residual         ostial stenosis side branch with THERESA III flow.        Lateral wall hypokinesis, low normal EF estimated 50%.        Systolic hypertension with moderate to severe elevation         of the LVEDP to 23 mmHg.     from 2009 demonstrated   CONCLUSIONS:  1.  100% occlusion of the distal right coronary artery.  2.  Unsuccessful thrombectomy and angioplasty of the distal right   coronary artery due to the tortuosity of the vessel and subintimal   dissection with the wire.  3.  Moderate to severe mid left anterior descending artery stenosis   with a borderline FFR at .81.  4.  Mild circumflex disease.  5.  Severe hypokinesis to akinesis at the basal inferior wall   indicating prior infarction.     Medications  Allergies   Current Outpatient Prescriptions   Medication Sig Dispense Refill   ? aspirin 81 MG EC tablet Take 81 mg by mouth daily.     ? aspirin-calcium carbonate 81 mg-300 mg calcium(777 mg) Tab Take 81 mg by mouth daily.     ? fluticasone (FLONASE) 50 mcg/actuation nasal spray 2 sprays in each nostril twice daily. 16 g 0   ? lisinopril-hydrochlorothiazide (PRINZIDE,ZESTORETIC) 20-25 mg per tablet Take 1 tablet by mouth every morning. 30 tablet 2   ? nitroglycerin (NITROSTAT) 0.4 MG SL tablet Place 0.4 mg under the tongue as needed.     ? atorvastatin (LIPITOR) 80 MG tablet Take 1 tablet (80 mg total) by mouth at bedtime. 90 tablet 0     No current facility-administered medications for this visit.       Allergies   Allergen Reactions   ? Diltiazem      Slows heart rate (bradycardia)         Physical  Examination Review of Systems   Vitals:    05/17/18 1623   BP: 134/80   Pulse: 75   Resp: 14     Body mass index is 45.93 kg/(m^2).  Wt Readings from Last 3 Encounters:   05/17/18 (!) 311 lb (141.1 kg)   04/24/18 (!) 314 lb (142.4 kg)   01/09/18 (!) 305 lb (138.3 kg)       General Appearance:   Pleasant man, obese, no distress   ENT/Mouth: membranes moist, no apparent gingival bleeding.      EYES:  no scleral icterus, normal conjunctivae   Neck: no carotid bruits. No anterior cervical lymphadenopaty   Respiratory:   lungs are clear to auscultation, no rales or wheezing, stable sternal scar. Sternum does not feel unstable, equal chest wall expansion    Cardiovascular:   Regular rhythm, normal rate. distant first and second heart sounds with no murmurs, rubs, or gallops; the carotid, radial and posterior tibial pulses are intact, Jugular venous pressure normal , no edema bilaterally    Abdomen/GI:  no organomegaly, masses, bruits, or tenderness; bowel sounds are present   Extremities: no cyanosis or clubbing   Skin: no xanthelasma, warm.    Heme/lymph/ Immunology No apparent bleeding noted.   Neurologic: Alert and oriented. normal gait, no tremors     Psychiatric: Pleasant, calm, appropriate affect.    A complete 10 system review of systems was performed and is negative except as mentioned in the HPI or below:  General: WNL  Eyes: WNL  Ears/Nose/Throat: WNL  Lungs: Shortness of Breath  Heart: Shortness of Breath with activity  Stomach: WNL  Bladder: WNL  Muscle/Joints: WNL  Skin: WNL  Nervous System: Daytime Sleepiness  Mental Health: WNL     Blood: WNL       Past History   Past Medical History:   Past Medical History:   Diagnosis Date   ? CAD (coronary artery disease)    ? HTN (hypertension)    ? MI (myocardial infarction)    ? Toe contusion        Past Surgical History:   Past Surgical History:   Procedure Laterality Date   ? CORONARY STENT PLACEMENT  10/09/2014   ? UT CABG, VEIN, SINGLE      Description: CABG  (CABG);  Recorded: 11/12/2014;  Comments: 2009   ? AR REMOVAL OF SPERM DUCT(S)      Description: Surgery Of Male Genitalia Vasectomy;  Recorded: 11/12/2014;  Comments: 2009       Family History:   Family History   Problem Relation Age of Onset   ? Heart disease Maternal Grandfather    ? Diabetes Mother    ? Hyperlipidemia Mother    ? Hypertension Mother    ? Heart disease Father      four heart attacks. first at age 49   ? Diabetes Father    ? Hyperlipidemia Father        Social History:   Social History     Social History   ? Marital status:      Spouse name: N/A   ? Number of children: N/A   ? Years of education: N/A     Occupational History   ? Not on file.     Social History Main Topics   ? Smoking status: Former Smoker   ? Smokeless tobacco: Current User      Comment: Quit 10/11/14 and uses E-cigarette daily    ? Alcohol use No   ? Drug use: No   ? Sexual activity: Not on file     Other Topics Concern   ? Not on file     Social History Narrative              Lab Results    Chemistry/lipid CBC Cardiac Enzymes/BNP/TSH/INR   Lab Results   Component Value Date    CHOL 164 08/31/2016    HDL 39 (L) 08/31/2016    LDLCALC 113 08/31/2016    TRIG 61 08/31/2016    CREATININE 0.75 08/31/2016    BUN 10 08/31/2016    K 3.9 08/31/2016     08/31/2016     08/31/2016    CO2 21 (L) 08/31/2016    Lab Results   Component Value Date    WBC 8.5 06/18/2010    HGB 14.6 06/18/2010    HCT 41.8 06/18/2010    MCV 93 06/18/2010     06/18/2010    Lab Results   Component Value Date    CKMB 5 06/18/2010    TROPONINI <0.01 06/18/2010    TSH 3.76 08/13/2015    INR 0.98 06/18/2010          Rambo Mars MD  Non-invasive Cardiology  [facility]

## 2021-06-26 NOTE — PROGRESS NOTES
Progress Notes by Kriss Vinson MD at 9/13/2018  6:50 PM     Author: Kriss Vinson MD Service: -- Author Type: Physician    Filed: 9/13/2018  8:35 PM Encounter Date: 9/13/2018 Status: Signed    : Kriss Vinson MD (Physician)         Subjective:   Sigifredo Shepard is a 47 y.o. male  Roomed by: Odette COKER    Accompanied by Spouse    Refills needed? No    Do you have any forms that need to be filled out? No      Chief Complaint   Patient presents with   ? Heart Problem   47-year-old man with a history of a double coronary artery bypass in his 30s presents with more frequent palpitations in the last several weeks.  Patient says that after his 2 heart attacks when he was in his 30s he was getting more frequent palpitations for years.  Then says that the palpitations went away for several years and have only come back in the last several weeks to months.  He denies any symptoms like he had when he had his heart attack which were jaw pain and left arm pain.  He also denies any current chest pain,  shortness of breath, nausea, or diaphoresis.  Says that sometimes the palpitations can take his breath away.  Says that sometimes the palpitations can be several times an hour to nothing an hour.  Review of Systems  See HPI for ROS, otherwise balance of other systems negative    Allergies   Allergen Reactions   ? Diltiazem      Slows heart rate (bradycardia)        Current Outpatient Prescriptions:   ?  aspirin 81 MG EC tablet, Take 81 mg by mouth daily., Disp: , Rfl:   ?  atorvastatin (LIPITOR) 80 MG tablet, Take 1 tablet (80 mg total) by mouth at bedtime., Disp: 90 tablet, Rfl: 0  ?  acetaminophen (TYLENOL) 325 MG tablet, Take 325-650 mg by mouth every 6 (six) hours as needed for pain., Disp: , Rfl:   ?  benzocaine (ORAJEL) 10 % mucosal gel, Apply 1 application to the mouth or throat as needed for pain (tooth pain). , Disp: , Rfl:   ?  fluticasone (FLONASE) 50 mcg/actuation nasal spray, 2 sprays in each nostril  "twice daily., Disp: 16 g, Rfl: 0  ?  HYDROcodone-acetaminophen 5-325 mg per tablet, Take 1-2 tablets by mouth every 4 (four) hours as needed for pain., Disp: 40 tablet, Rfl: 0  ?  lisinopril-hydrochlorothiazide (PRINZIDE,ZESTORETIC) 20-25 mg per tablet, Take 1 tablet by mouth every morning., Disp: 90 tablet, Rfl: 3  ?  nitroglycerin (NITROSTAT) 0.4 MG SL tablet, Place 0.4 mg under the tongue every 5 (five) minutes as needed for chest pain. If no relief after 5 min call 911; Continue 1 tab every 5 min. Max 3 tabs., Disp: , Rfl:   Patient Active Problem List   Diagnosis   ? STEMI (ST elevation myocardial infarction) (H)   ? HLD (hyperlipidemia)   ? Morbid obesity (H)   ? Essential hypertension   ? CAD (coronary artery disease)   ? Anginal equivalent (H)   ? Obstructive sleep apnea   ? S/P CABG x 2   ? Stented coronary artery   ? Acute respiratory failure (H)   ? Nasal polyposis     Past Medical History:   Diagnosis Date   ? CAD (coronary artery disease)    ? HTN (hypertension)    ? MI (myocardial infarction)    ? Nasal polyp    ? DOMINGUEZ (obstructive sleep apnea)    ? Sebaceous cyst 06/26/2018    lanced    ? Toe contusion     - if none on file, see Problem List    Objective:     Vitals:    09/13/18 1913 09/13/18 1917   BP: 141/76 143/73   Pulse: 95 73   Resp: 18    Temp: 98.2  F (36.8  C)    TempSrc: Oral    SpO2: 98%    Weight: (!) 317 lb (143.8 kg)    Height: 5' 10\" (1.778 m)    Gen - Pt in NAD  Cor - RRR w/o murmur  Lungs - Good air entry, no wheezes or crackles noted; no coughing noted  Neuro - non focal    Results for orders placed or performed in visit on 09/13/18   Electrocardiogram Perform - Clinic   Result Value Ref Range    SYSTOLIC BLOOD PRESSURE  mmHg    DIASTOLIC BLOOD PRESSURE  mmHg    VENTRICULAR RATE 92 BPM    ATRIAL RATE 92 BPM    P-R INTERVAL 138 ms    QRS DURATION 116 ms    Q-T INTERVAL 390 ms    QTC CALCULATION (BEZET) 482 ms    P Axis 41 degrees    R AXIS 10 degrees    T AXIS 50 degrees    MUSE " DIAGNOSIS       Normal sinus rhythm  Incomplete right bundle branch block  Inferior infarct (cited on or before 17-JUL-2018)  Abnormal ECG  When compared with ECG of 17-JUL-2018 16:15,  No significant change was found     Preliminary EKG result discussed on dayof visit -finding of previous IMI was noted.    Assessment - Plan   Medical Decision Making -47-year-old man with a history of coronary artery disease status post CABG ×2 presents with more frequent palpitations without any chest pain, shortness of breath, diaphoresis, nausea, neck pain or left arm pain.  Discussed the patient that his rhythm strip only showed 1 ectopic beat in 2 minutes.  Recommended that patient follow with primary as soon as possible.  Was able to get an appointment on 9/18.  Strongly recommended the patient either call 911 or be seen immediately in an emergency department if he feels he might be having a heart attack.  Discussed that his primary may order a Holter monitor.  No evidence of acute coronary syndrome was found today.    1. Palpitations  - Electrocardiogram Perform - Clinic    2. History of MI (myocardial infarction)    3. CAD (coronary artery disease)    At the conclusion of the encounter, assessment and plan were discussed.   All questions were answered.   The patient or guardian acknowledged understanding and was involved in the decision making regarding the overall care plan.    Patient Instructions     1. If you are concerned about having another heart attack or your skipped beats get worrisome, either call 911 or be seen in an emergency department immediately  2. Keep your follow up appointment on 9/18  3. If you have any questions, call the clinic number - the number is answered 24/7    Understanding Heart Palpitations    Heart palpitations are a symptom. Its the feeling you have when your heartbeat seems to be racing, pounding, skipping, or fluttering. Heart palpitations are most often felt in the chest. Sometimes, they  may also be felt in the neck.  What causes heart palpitations?  In most cases, heart palpitations are caused by:    Stress or anxiety    Exercise    Pregnancy    Some medicines    Caffeine    Nicotine    Alcohol    Illegal drugs, such as cocaine    Health problems, such as anemia or overactive thyroid  In some cases, heart palpitations may be caused by a problem with the heart. Abnormal heart rhythms (arrhythmias) are the main concern. They may need to be managed by you and your healthcare provider or treated right away.  How are heart palpitations treated?  Treatments for heart palpitations depend on the cause. Options may include:    Managing the things that trigger your heart palpitations. This could mean:  ? Learning ways to reduce stress and anxiety  ? Avoiding caffeine, nicotine, alcohol, or illegal drugs  ? Stopping the use of certain medicines, under your doctors guidance    Medicines, procedures, or surgery to treat an arrhythmia or other health problem that is causing your symptoms  What are the complications of heart palpitations?  Complications of heart palpitations are rare unless they are caused by a problem such as an arrhythmia. In such cases, complications can include:    Fainting    Heart failure. This problem occurs when the heart is so weak it no longer pumps blood well.    Blood clots and stroke    Sudden cardiac arrest. This problem occurs when the heart suddenly stops beating.  When should I call my healthcare provider?  Call your healthcare provider right away if you have any of these:    Fever of 100.4 F (38 C) or higher, or as directed    Symptoms that dont get better with treatment, or symptoms that get worse    New symptoms, such as chest pain, shortness of breath, dizziness, or fainting   Date Last Reviewed: 5/1/2016 2000-2017 [employer]. [address], [place] [address]. All rights reserved. This information is not intended as a substitute for professional  medical care. Always follow your healthcare professional's instructions.

## 2021-06-27 NOTE — PROGRESS NOTES
Progress Notes by Shahnaz Dorsey CNP at 2/11/2019  7:30 AM     Author: Shahnaz Dorsey CNP Service: -- Author Type: Nurse Practitioner    Filed: 2/11/2019  9:12 AM Encounter Date: 2/11/2019 Status: Signed    : Shahnaz Dorsey CNP (Nurse Practitioner)       Chief Complaint   Patient presents with   ? Back Pain     x4d, lower back got worse x2d ago, OTC meds doesn't help       ASSESSMENT & PLAN:   Diagnoses and all orders for this visit:    Acute right-sided low back pain without sciatica    Chronic right hip pain    Other orders  -     cyclobenzaprine (FLEXERIL) 5 MG tablet  Dispense: 30 tablet; Refill: 0  -     predniSONE (DELTASONE) 20 MG tablet  Dispense: 10 tablet; Refill: 0        MDM:  Patient with right sided lumbar area back pain.  Has known right hip pain and right groin pain with workup done for same several years ago and no real follow-up.  Patient does complain of pain around the right hip and gluteus medius muscle with internal rotation of right hip.  Thus, this is lumbar pain versus hip pain versus both together.  No specific injury.  Obesity likely contributing.     Pain is 8 out of 10 after having taken 4 ibuprofen and 2 extra strength Tylenol.  We will give a 5-day course of prednisone.  Recommended stopping ibuprofen due to stomach side effects with prednisone.  Continue Tylenol.  Cyclobenzaprine for muscle pain.  Follow-up with primary care in approximately 4 days for recheck, possible Ortho referral and/or physical therapy.      Even work note for 4-hour shifts, no bending at the waist to lift, 10 pound weight restriction until the 15th.    Supportive care discussed.  See discharge instructions below for specific recommendations given.    At the end of the encounter, I discussed results, diagnosis, medications. Discussed red flags for immediate return to clinic/ER, as well as indications for follow up if no improvement. Patient and/or caregiver understood and agreed to plan. Patient  "was stable for discharge.    SUBJECTIVE    HPI:  Sigifredo Shepard is a 48 y.o. Male who presents to the walk-in clinic with 4 days of right lower back pain.   No specific injury.  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  Has never had back issues.      History is significant for severe morbid obesity.       Took Advil/Tylenol, heat, stretches without relief.  Took 4 advil and 2 tyl just PTA - pain 6/10 with sitting and 8/10.      Doesn't radiate to legs nor anteriorly; radiates somewhat to rt buttock.       History of right hip pain since age 26 - was told he has \"the hip of a 52 year old.\"  Has chronic off and on rt groin pain assoc with this.      Hasn't taken BP meds this morning.              History obtained from the patient.    Past Medical History:   Diagnosis Date   ? CAD (coronary artery disease)    ? HTN (hypertension)    ? MI (myocardial infarction) (H)    ? Nasal polyp    ? DOMINGUEZ (obstructive sleep apnea)    ? Sebaceous cyst 06/26/2018    lanced    ? Toe contusion        Problem List:  2018-07: Acute respiratory failure (H)  2018-07: Nasal polyposis  2018-04: Anginal equivalent (H)  2016-10: Essential hypertension  2015-02: STEMI (ST elevation myocardial infarction) (H)  2015-02: Pain in joint, ankle and foot  2015-02: Sprain of ankle, unspecified site  2015-02: HLD (hyperlipidemia)  2015-02: Morbid obesity (H)  2014-10: S/P CABG x 2  2014-10: Stented coronary artery  2010-12: Depressive disorder  2009-09: CAD (coronary artery disease)  2007-12: Obstructive sleep apnea      Social History     Tobacco Use   ? Smoking status: Never Smoker   ? Smokeless tobacco: Never Used   ? Tobacco comment: Quit 10/11/14 and uses E-cigarette daily - small tank per day   Substance Use Topics   ? Alcohol use: Yes     Comment: rare       Review of Systems   Constitutional: Negative for chills and fever.   Genitourinary: Negative for dysuria, hematuria, testicular pain and urgency.   Musculoskeletal: Positive for arthralgias, back pain " and gait problem.   Neurological: Positive for numbness (lt fingers - chronic).       OBJECTIVE    Vitals:    02/11/19 0736 02/11/19 0757   BP: (!) 165/97 (!) 162/100   Patient Site: Right Arm    Patient Position: Sitting    Cuff Size: Adult Large    Pulse: 86    Resp: 16    Temp: 97.6  F (36.4  C)    TempSrc: Oral    SpO2: 96%    Weight: (!) 322 lb 9.6 oz (146.3 kg)        Physical Exam   Constitutional: He is oriented to person, place, and time. He appears well-developed and well-nourished. No distress.   HENT:   Right Ear: External ear normal.   Left Ear: External ear normal.   Eyes: Conjunctivae are normal. Right eye exhibits no discharge. Left eye exhibits no discharge.   Cardiovascular: Intact distal pulses.   Pulmonary/Chest: Effort normal.   Musculoskeletal: Normal range of motion.   Pain in rt hip/buttock with internal rotation of hip.  Neg straight leg raise   Neurological: He is alert and oriented to person, place, and time. No sensory deficit.   Walking with a limp.  Normal strengths bilateral lower extremities.   Skin: Skin is warm and dry. Capillary refill takes less than 2 seconds.   Psychiatric: He has a normal mood and affect. His behavior is normal. Judgment and thought content normal.       Labs:  No results found for this or any previous visit (from the past 240 hour(s)).      Radiology:    No results found.    PATIENT INSTRUCTIONS:   Patient Instructions     Patient to follow up with Primary Care provider regarding elevated blood pressure.      Patient Education     Relieving Back Pain  Back pain is a common problem. You can strain back muscles by lifting too much weight or just by moving the wrong way. Back strain can be uncomfortable, even painful. And it can take weeks or months to improve. To help yourself feel better and prevent future back strains, try these tips.  Important Note: Do not give aspirin to children or teens without first discussing it with your healthcare provider.       ? Ice    Ice reduces muscle pain and swelling. It helps most during the first 24 to 48 hours after an injury.    Wrap an ice pack or a bag of frozen peas in a thin towel. (Never place ice directly on your skin.)    Place the ice where your back hurts the most.    Dont ice for more than 20 minutes at a time.    You can use ice several times a day.  ? Medicines  Over-the-counter pain relievers can include acetaminophen and anti-inflammatory medicines, which includes aspirin or ibuprofen. They can help ease discomfort. Some also reduce swelling.    Tell your healthcare provider about any medicines you are already taking.    Take medicines only as directed.  ? Heat  After the first 48 hours, heat can relax sore muscles and improve blood flow.    Try a warm bath or shower. Or use a heating pad set on low. To prevent a burn, keep a cloth between you and the heating pad.    Dont use a heating pad for more than 15 minutes at a time. Never sleep on a heating pad.  Date Last Reviewed: 9/1/2015 2000-2017 [employer]. [address] [place] [address]. All rights reserved. This information is not intended as a substitute for professional medical care. Always follow your healthcare professional's instructions.           Patient Education     Understanding Osteoarthritis of the Hip    A joint is a place where two bones meet. The hip is a ball-and-socket joint. It is formed where the ball at the top of the thighbone (femur) rests in the socket in the pelvic bone. The hip joint allows the leg to move freely in many directions.  Hip osteoarthritis is a condition where parts of the hip joint wear out. It can lead to pain, stiffness, and limited movement.   What is osteoarthritis?  All joints contain a smooth tissue called cartilage. Cartilage cushions the ends of bones, helping them glide against each other. Hip osteoarthritis occurs when cartilage in the hip joint begins to break down and wear away. The  ball and socket bones may then become exposed and rub together. The cartilage may become rough and pitted and may begin to wear away. This prevents smooth movement of the joint and can lead to pain.  Causes of osteoarthritis of the hip  Causes can include:    Wear and tear from normal use of the hip over time    Overuse of the hip during sports or work activities    Being overweight. This increases stress on the hip joint.    Injury to the hip    Infection of the hip joint  Symptoms of osteoarthritis of the hip  The main symptom of hip osteoarthritis is pain. The pain is most often felt in the groin area. It may also travel down the leg to the knee or to the back of the hip. The pain usually gets worse with activity, such as walking or climbing stairs. The pain may get better with rest. The joint may also be stiff first thing in the morning or after periods of sitting or lying down. Stiffness usually gets better with movement.  Treating osteoarthritis of the hip  Osteoarthritis is a long-term condition. Treatment usually focuses on managing symptoms. Treatment may include:    Over-the-counter or prescription medicines taken by mouth to help relieve pain and swelling    Injections of medicine into the joint to help relieve symptoms for a time    A weight-loss plan if you are overweight    A plan of physical therapy and exercises to improve strength and flexibility around the joint    Cold or heat packs help relieve pain and stiffness    Assistive devices that help with movement, such as a cane or a walker    Assistive devices that make activities of daily life easier, such as raised toilet seats or shower bars  If other treatments dont do enough to relieve severe symptoms, you may need surgery to replace the joint. This surgery replaces the hip joint with an artificial joint. It can help relieve pain and stiffness and improve function of the hip.     When to call your healthcare provider  Call your healthcare  provider right away if you have any of these:    Fever of 100.4 F (38 C) or higher, or as directed    Symptoms that dont get better with prescribed medicines or get worse    New symptoms   Date Last Reviewed: 3/10/2016    4003-3321 [employer]. [address] [place] [address], [place] [address]. All rights reserved. This information is not intended as a substitute for professional medical care. Always follow your healthcare professional's instructions.

## 2021-07-03 NOTE — ADDENDUM NOTE
Addendum Note by Carina Carter MD at 2/15/2019 10:15 AM     Author: Carina Carter MD Service: -- Author Type: Physician    Filed: 2/18/2019  2:54 PM Encounter Date: 2/15/2019 Status: Signed    : Carina Carter MD (Physician)    Addended by: CARINA CARTER on: 2/18/2019 02:54 PM        Modules accepted: Orders

## 2021-09-25 ENCOUNTER — HEALTH MAINTENANCE LETTER (OUTPATIENT)
Age: 51
End: 2021-09-25

## 2022-01-15 ENCOUNTER — HEALTH MAINTENANCE LETTER (OUTPATIENT)
Age: 52
End: 2022-01-15

## 2023-01-07 ENCOUNTER — HEALTH MAINTENANCE LETTER (OUTPATIENT)
Age: 53
End: 2023-01-07

## 2023-04-22 ENCOUNTER — HEALTH MAINTENANCE LETTER (OUTPATIENT)
Age: 53
End: 2023-04-22